# Patient Record
Sex: FEMALE | Race: WHITE | NOT HISPANIC OR LATINO
[De-identification: names, ages, dates, MRNs, and addresses within clinical notes are randomized per-mention and may not be internally consistent; named-entity substitution may affect disease eponyms.]

---

## 2017-03-17 PROBLEM — Z00.00 ENCOUNTER FOR PREVENTIVE HEALTH EXAMINATION: Status: ACTIVE | Noted: 2017-03-17

## 2017-03-22 ENCOUNTER — APPOINTMENT (OUTPATIENT)
Dept: OBGYN | Facility: CLINIC | Age: 22
End: 2017-03-22

## 2017-03-22 VITALS
DIASTOLIC BLOOD PRESSURE: 69 MMHG | HEIGHT: 60 IN | WEIGHT: 115 LBS | SYSTOLIC BLOOD PRESSURE: 104 MMHG | BODY MASS INDEX: 22.58 KG/M2

## 2017-03-22 DIAGNOSIS — Z01.419 ENCOUNTER FOR GYNECOLOGICAL EXAMINATION (GENERAL) (ROUTINE) W/OUT ABNORMAL FINDINGS: ICD-10-CM

## 2017-03-22 DIAGNOSIS — Z30.431 ENCOUNTER FOR ROUTINE CHECKING OF INTRAUTERINE CONTRACEPTIVE DEVICE: ICD-10-CM

## 2017-03-27 LAB
C TRACH RRNA SPEC QL NAA+PROBE: NORMAL
CYTOLOGY CVX/VAG DOC THIN PREP: NORMAL
N GONORRHOEA RRNA SPEC QL NAA+PROBE: NORMAL
SOURCE AMPLIFICATION: NORMAL

## 2017-12-14 ENCOUNTER — APPOINTMENT (OUTPATIENT)
Dept: OBGYN | Facility: CLINIC | Age: 22
End: 2017-12-14

## 2018-03-13 ENCOUNTER — APPOINTMENT (OUTPATIENT)
Dept: OBGYN | Facility: CLINIC | Age: 23
End: 2018-03-13
Payer: COMMERCIAL

## 2018-03-13 VITALS — SYSTOLIC BLOOD PRESSURE: 120 MMHG | DIASTOLIC BLOOD PRESSURE: 75 MMHG

## 2018-03-13 DIAGNOSIS — Z11.3 ENCOUNTER FOR SCREENING FOR INFECTIONS WITH A PREDOMINANTLY SEXUAL MODE OF TRANSMISSION: ICD-10-CM

## 2018-03-13 DIAGNOSIS — N89.8 OTHER SPECIFIED NONINFLAMMATORY DISORDERS OF VAGINA: ICD-10-CM

## 2018-03-13 PROCEDURE — 99213 OFFICE O/P EST LOW 20 MIN: CPT

## 2018-03-13 RX ORDER — BUPRENORPHINE AND NALOXONE 2; .5 MG/1; MG/1
2-0.5 TABLET SUBLINGUAL
Qty: 6 | Refills: 0 | Status: ACTIVE | COMMUNITY
Start: 2018-02-09

## 2018-03-13 RX ORDER — ESCITALOPRAM OXALATE 5 MG/1
TABLET, FILM COATED ORAL
Refills: 0 | Status: ACTIVE | COMMUNITY

## 2018-03-19 ENCOUNTER — OTHER (OUTPATIENT)
Age: 23
End: 2018-03-19

## 2018-03-19 LAB
C TRACH RRNA SPEC QL NAA+PROBE: NOT DETECTED
CANDIDA VAG CYTO: NOT DETECTED
G VAGINALIS+PREV SP MTYP VAG QL MICRO: DETECTED
N GONORRHOEA RRNA SPEC QL NAA+PROBE: NOT DETECTED
SOURCE AMPLIFICATION: NORMAL
T VAGINALIS VAG QL WET PREP: NOT DETECTED

## 2018-03-19 RX ORDER — METRONIDAZOLE 7.5 MG/G
0.75 GEL VAGINAL
Qty: 1 | Refills: 0 | Status: ACTIVE | COMMUNITY
Start: 2018-03-19 | End: 1900-01-01

## 2018-03-19 RX ORDER — METRONIDAZOLE 7.5 MG/G
0.75 GEL VAGINAL
Qty: 7 | Refills: 0 | Status: ACTIVE | COMMUNITY
Start: 2018-03-19 | End: 1900-01-01

## 2018-04-09 ENCOUNTER — APPOINTMENT (OUTPATIENT)
Dept: OBGYN | Facility: CLINIC | Age: 23
End: 2018-04-09
Payer: COMMERCIAL

## 2018-04-09 ENCOUNTER — ASOB RESULT (OUTPATIENT)
Age: 23
End: 2018-04-09

## 2018-04-09 DIAGNOSIS — Z30.430 ENCOUNTER FOR INSERTION OF INTRAUTERINE CONTRACEPTIVE DEVICE: ICD-10-CM

## 2018-04-09 DIAGNOSIS — Z30.432 ENCOUNTER FOR REMOVAL OF INTRAUTERINE CONTRACEPTIVE DEVICE: ICD-10-CM

## 2018-04-09 LAB — HCG UR QL: NEGATIVE

## 2018-04-09 PROCEDURE — 76830 TRANSVAGINAL US NON-OB: CPT

## 2018-04-09 PROCEDURE — 81025 URINE PREGNANCY TEST: CPT

## 2018-04-09 PROCEDURE — 58301 REMOVE INTRAUTERINE DEVICE: CPT

## 2018-04-09 PROCEDURE — 58300 INSERT INTRAUTERINE DEVICE: CPT

## 2018-09-04 ENCOUNTER — EMERGENCY (EMERGENCY)
Facility: HOSPITAL | Age: 23
LOS: 1 days | End: 2018-09-04
Attending: EMERGENCY MEDICINE
Payer: COMMERCIAL

## 2018-09-04 VITALS
TEMPERATURE: 99 F | RESPIRATION RATE: 18 BRPM | HEART RATE: 70 BPM | SYSTOLIC BLOOD PRESSURE: 109 MMHG | DIASTOLIC BLOOD PRESSURE: 72 MMHG | HEIGHT: 64 IN | WEIGHT: 119.93 LBS | OXYGEN SATURATION: 98 %

## 2018-09-04 DIAGNOSIS — F32.9 MAJOR DEPRESSIVE DISORDER, SINGLE EPISODE, UNSPECIFIED: ICD-10-CM

## 2018-09-04 DIAGNOSIS — F63.3 TRICHOTILLOMANIA: ICD-10-CM

## 2018-09-04 DIAGNOSIS — F13.10 SEDATIVE, HYPNOTIC OR ANXIOLYTIC ABUSE, UNCOMPLICATED: ICD-10-CM

## 2018-09-04 DIAGNOSIS — F11.10 OPIOID ABUSE, UNCOMPLICATED: ICD-10-CM

## 2018-09-04 LAB
AMPHET UR-MCNC: NEGATIVE — SIGNIFICANT CHANGE UP
ANION GAP SERPL CALC-SCNC: 15 MMOL/L — SIGNIFICANT CHANGE UP (ref 5–17)
APAP SERPL-MCNC: <7.5 UG/ML — LOW (ref 10–26)
APPEARANCE UR: CLEAR — SIGNIFICANT CHANGE UP
BACTERIA # UR AUTO: ABNORMAL
BARBITURATES UR SCN-MCNC: NEGATIVE — SIGNIFICANT CHANGE UP
BASOPHILS # BLD AUTO: 0 K/UL — SIGNIFICANT CHANGE UP (ref 0–0.2)
BASOPHILS NFR BLD AUTO: 0.1 % — SIGNIFICANT CHANGE UP (ref 0–2)
BENZODIAZ UR-MCNC: NEGATIVE — SIGNIFICANT CHANGE UP
BILIRUB UR-MCNC: NEGATIVE — SIGNIFICANT CHANGE UP
BUN SERPL-MCNC: 13 MG/DL — SIGNIFICANT CHANGE UP (ref 8–20)
CALCIUM SERPL-MCNC: 9.5 MG/DL — SIGNIFICANT CHANGE UP (ref 8.6–10.2)
CHLORIDE SERPL-SCNC: 99 MMOL/L — SIGNIFICANT CHANGE UP (ref 98–107)
CO2 SERPL-SCNC: 26 MMOL/L — SIGNIFICANT CHANGE UP (ref 22–29)
COCAINE METAB.OTHER UR-MCNC: NEGATIVE — SIGNIFICANT CHANGE UP
COLOR SPEC: YELLOW — SIGNIFICANT CHANGE UP
CREAT SERPL-MCNC: 0.67 MG/DL — SIGNIFICANT CHANGE UP (ref 0.5–1.3)
DIFF PNL FLD: NEGATIVE — SIGNIFICANT CHANGE UP
EOSINOPHIL # BLD AUTO: 0.1 K/UL — SIGNIFICANT CHANGE UP (ref 0–0.5)
EOSINOPHIL NFR BLD AUTO: 0.6 % — SIGNIFICANT CHANGE UP (ref 0–6)
EPI CELLS # UR: SIGNIFICANT CHANGE UP
ETHANOL SERPL-MCNC: <10 MG/DL — SIGNIFICANT CHANGE UP
GLUCOSE SERPL-MCNC: 155 MG/DL — HIGH (ref 70–115)
GLUCOSE UR QL: NEGATIVE MG/DL — SIGNIFICANT CHANGE UP
HCG UR QL: NEGATIVE — SIGNIFICANT CHANGE UP
HCT VFR BLD CALC: 39.6 % — SIGNIFICANT CHANGE UP (ref 37–47)
HGB BLD-MCNC: 12.8 G/DL — SIGNIFICANT CHANGE UP (ref 12–16)
KETONES UR-MCNC: NEGATIVE — SIGNIFICANT CHANGE UP
LEUKOCYTE ESTERASE UR-ACNC: ABNORMAL
LYMPHOCYTES # BLD AUTO: 1.4 K/UL — SIGNIFICANT CHANGE UP (ref 1–4.8)
LYMPHOCYTES # BLD AUTO: 14.7 % — LOW (ref 20–55)
MCHC RBC-ENTMCNC: 29.6 PG — SIGNIFICANT CHANGE UP (ref 27–31)
MCHC RBC-ENTMCNC: 32.3 G/DL — SIGNIFICANT CHANGE UP (ref 32–36)
MCV RBC AUTO: 91.5 FL — SIGNIFICANT CHANGE UP (ref 81–99)
METHADONE UR-MCNC: NEGATIVE — SIGNIFICANT CHANGE UP
MONOCYTES # BLD AUTO: 0.9 K/UL — HIGH (ref 0–0.8)
MONOCYTES NFR BLD AUTO: 9.4 % — SIGNIFICANT CHANGE UP (ref 3–10)
NEUTROPHILS # BLD AUTO: 7.2 K/UL — SIGNIFICANT CHANGE UP (ref 1.8–8)
NEUTROPHILS NFR BLD AUTO: 75.1 % — HIGH (ref 37–73)
NITRITE UR-MCNC: NEGATIVE — SIGNIFICANT CHANGE UP
OPIATES UR-MCNC: POSITIVE
PCP SPEC-MCNC: SIGNIFICANT CHANGE UP
PCP UR-MCNC: NEGATIVE — SIGNIFICANT CHANGE UP
PH UR: 9 — HIGH (ref 5–8)
PLATELET # BLD AUTO: 228 K/UL — SIGNIFICANT CHANGE UP (ref 150–400)
POTASSIUM SERPL-MCNC: 4 MMOL/L — SIGNIFICANT CHANGE UP (ref 3.5–5.3)
POTASSIUM SERPL-SCNC: 4 MMOL/L — SIGNIFICANT CHANGE UP (ref 3.5–5.3)
PROT UR-MCNC: 15 MG/DL
RBC # BLD: 4.33 M/UL — LOW (ref 4.4–5.2)
RBC # FLD: 13.8 % — SIGNIFICANT CHANGE UP (ref 11–15.6)
SALICYLATES SERPL-MCNC: <0.6 MG/DL — LOW (ref 10–20)
SODIUM SERPL-SCNC: 140 MMOL/L — SIGNIFICANT CHANGE UP (ref 135–145)
SP GR SPEC: 1.01 — SIGNIFICANT CHANGE UP (ref 1.01–1.02)
THC UR QL: POSITIVE
UROBILINOGEN FLD QL: NEGATIVE MG/DL — SIGNIFICANT CHANGE UP
WBC # BLD: 9.5 K/UL — SIGNIFICANT CHANGE UP (ref 4.8–10.8)
WBC # FLD AUTO: 9.5 K/UL — SIGNIFICANT CHANGE UP (ref 4.8–10.8)
WBC UR QL: SIGNIFICANT CHANGE UP

## 2018-09-04 PROCEDURE — 81001 URINALYSIS AUTO W/SCOPE: CPT

## 2018-09-04 PROCEDURE — 36415 COLL VENOUS BLD VENIPUNCTURE: CPT

## 2018-09-04 PROCEDURE — 81025 URINE PREGNANCY TEST: CPT

## 2018-09-04 PROCEDURE — 99285 EMERGENCY DEPT VISIT HI MDM: CPT

## 2018-09-04 PROCEDURE — 93005 ELECTROCARDIOGRAM TRACING: CPT

## 2018-09-04 PROCEDURE — 85027 COMPLETE CBC AUTOMATED: CPT

## 2018-09-04 PROCEDURE — 80307 DRUG TEST PRSMV CHEM ANLYZR: CPT

## 2018-09-04 PROCEDURE — 80048 BASIC METABOLIC PNL TOTAL CA: CPT

## 2018-09-04 PROCEDURE — 93010 ELECTROCARDIOGRAM REPORT: CPT

## 2018-09-04 RX ORDER — TRAZODONE HCL 50 MG
50 TABLET ORAL AT BEDTIME
Qty: 0 | Refills: 0 | Status: DISCONTINUED | OUTPATIENT
Start: 2018-09-04 | End: 2018-09-09

## 2018-09-04 RX ORDER — LOPERAMIDE HCL 2 MG
2 TABLET ORAL
Qty: 0 | Refills: 0 | Status: DISCONTINUED | OUTPATIENT
Start: 2018-09-04 | End: 2018-09-09

## 2018-09-04 RX ORDER — IBUPROFEN 200 MG
600 TABLET ORAL EVERY 4 HOURS
Qty: 0 | Refills: 0 | Status: DISCONTINUED | OUTPATIENT
Start: 2018-09-04 | End: 2018-09-09

## 2018-09-04 RX ADMIN — Medication 600 MILLIGRAM(S): at 23:54

## 2018-09-04 RX ADMIN — Medication 0.1 MILLIGRAM(S): at 22:31

## 2018-09-04 RX ADMIN — Medication 600 MILLIGRAM(S): at 22:31

## 2018-09-04 RX ADMIN — Medication 50 MILLIGRAM(S): at 22:31

## 2018-09-04 NOTE — ED BEHAVIORAL HEALTH ASSESSMENT NOTE - DIFFERENTIAL
Depressive disorder unspecified, heroine abuse, benzodiazepine abuse, trichotillomania r/o substance induced mood disorder r/o MDD

## 2018-09-04 NOTE — ED ADULT NURSE NOTE - HPI (INCLUDE ILLNESS QUALITY, SEVERITY, DURATION, TIMING, CONTEXT, MODIFYING FACTORS, ASSOCIATED SIGNS AND SYMPTOMS)
Patient came to ED today after going to [facility] seeking inpatient admission for depression, suicidal ideation and hx of substance abuse. She states she overdosed 2 days ago, and was taken to [facility]. Patient was tearful and expressed she was not "relieved" to be alive after her overdose. She reports she would have been "ok" if she "just didn't wake up". States she wants to get help for all of her "emotional problems" so she can "get off drugs and stop self-medicating". Patient lives with mother, who reports she has had to "kick her out" of her house several times related to her ongoing substance abuse. Patient also reports prior inpatient stay for psychiatric reasons after having a seizure from Xanax withdrawl at [facility]. Patient cooperative with intake, and denied current suicidal ideation. Patient employed, and expressed desire to get back to work once she gets "some help".
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
## 2018-09-04 NOTE — ED BEHAVIORAL HEALTH NOTE - BEHAVIORAL HEALTH NOTE
SWNote: pt seen by psych MD(Jodi) found to benefit from inpatient hospitalization. Worker called [facility](Sofia) no female bed available . Worker to search bed at other hospitals.

## 2018-09-04 NOTE — ED BEHAVIORAL HEALTH ASSESSMENT NOTE - SUMMARY
Patient is a 23 year old, female; domiciled with mother and step brother ; single; noncaregiver; employed ([employer]); past psychiatric history of depressives sx ; one prior  psychiatric  hospitalization last year at [facility] for depression and suicidal ideation; no known suicide attempts; h/o of one prior arrest for possession of stolen property; with h/o heroine/opioid  and Xanax abuse, prior withdrawal seizure from Xanax; with h/o 3 prior rehabs (last time last year) and 2 prior detox's,  with no significant past medical history; brought in by EMS from [facility]  after she presented there requesting psychiatric admission and they did not have beds available.  Patient presented two days go to [facility] after accidental overdose, reports worsening depressive s'x, with continued suicidal ideation, states she feels she is a burden to family and reports feeling upset that her life was saved, she is not able to contract for safety but want helps and wants to voluntarily sign herself into hospital. She has been using heroine on a daily basis (last time two days ago).  No psychotic or manic sx's elicited. Patient reports significant anxiety even when she is not using drugs. Collateral obtained by mom reports patient is severely depressed and she has concerns about his safety

## 2018-09-04 NOTE — ED BEHAVIORAL HEALTH NOTE - BEHAVIORAL HEALTH NOTE
SWNote: worker spoke with [facility]) possible bed in [place]. Worker faxed all needed paperwork, spoke with [facility] (Dr Foley) who will  with case review outcome. SW to follow.

## 2018-09-04 NOTE — ED BEHAVIORAL HEALTH ASSESSMENT NOTE - SUICIDE RISK FACTORS
Agitation/severe anxiety/Perceived burden on family and others/Unable to engage in safety planning/Mood episode/History of abuse/trauma/Hopelessness/Substance abuse/dependence/Access to means (pills, firearms, etc.)

## 2018-09-04 NOTE — ED BEHAVIORAL HEALTH ASSESSMENT NOTE - OTHER PAST PSYCHIATRIC HISTORY (INCLUDE DETAILS REGARDING ONSET, COURSE OF ILLNESS, INPATIENT/OUTPATIENT TREATMENT)
One prior inpatient hospitalization at [facility] last year for depressive sx's and suicidal ideation.  Denies prior suicide attempts.  H/o sporadic outpatient tx with therapist and psychiatrist. Not currently in tx. H/o trichotillomania

## 2018-09-04 NOTE — ED BEHAVIORAL HEALTH ASSESSMENT NOTE - DESCRIPTION (FIRST USE, LAST USE, QUANTITY, FREQUENCY, DURATION)
smokes 4-5 cigarettes daily First use at age 16. Patient reports that she snorts heroine (does not use IV) and also abuses oxycodone, longest period of sobriety was a year and 1/2 at age 19. Last use was 6 bags two days ago where she presented to [facility] after overdose. h/o Xanax abuse -not used for several months

## 2018-09-04 NOTE — ED ADULT TRIAGE NOTE - CHIEF COMPLAINT QUOTE
Overdosed on heroin 2 days ago and sorry she lived.  Went to [facility] today for suicidal thoughts.  No beds.  Denies alcohol/drug use today. alert and oriented X 3.  Accepted directly to behavioral health after EKG.

## 2018-09-04 NOTE — ED ADULT NURSE NOTE - OBJECTIVE STATEMENT
Patient came to ED today after going to [facility] seeking inpatient admission for depression, suicidal ideation and hx of substance abuse. She states she overdosed 2 days ago, and was taken to [facility]. Patient was tearful and expressed she was not "relieved" to be alive after her overdose. She reports she would have been "ok" if she "just didn't wake up". Patient came to ED today after going to [facility] seeking inpatient admission for depression, suicidal ideation and hx of substance abuse. She states she overdosed 2 days ago, and was taken to [facility]. Patient was tearful and expressed she was not "relieved" to be alive after her overdose. She reports she would have been "ok" if she "just didn't wake up". States she wants to get help for all of her "emotional problems" so she can "get off drugs and stop self-medicating". Patient lives with mother, who reports she has had to "kick her out" of her house several times related to her ongoing substance abuse. Patient also reports prior inpatient stay for psychiatric reasons after having a seizure from Xanax withdrawl at [facility]. Patient cooperative with intake, and denied current suicidal ideation. Patient employed, and expressed desire to get back to work once she gets "some help".

## 2018-09-04 NOTE — ED ADULT NURSE NOTE - CHIEF COMPLAINT QUOTE
Overdosed on heroin 2 days ago and sorry she lived.  Went to [facility] today for suicidal thoughts.  No beds.  Denies alcohol/drug use today. alert and oriented X 3.  Accepted directly to behavioral health after EKG.

## 2018-09-04 NOTE — ED BEHAVIORAL HEALTH ASSESSMENT NOTE - HPI (INCLUDE ILLNESS QUALITY, SEVERITY, DURATION, TIMING, CONTEXT, MODIFYING FACTORS, ASSOCIATED SIGNS AND SYMPTOMS)
Patient is a 23 year old, female; domiciled with mother and step brother ; single; noncaregiver; employed ([email or web address]); past psychiatric history of depressives sx ; one prior  psychiatric  hospitalization last year at [facility] for depression and suicidal ideation; no known suicide attempts; h/o of one prior arrest for possession of stolen property; with h/o heroine/opioid  and Xanax abuse, prior withdrawal seizure from Xanax; with h/o 3 prior rehabs (last time last year) and 2 prior detox's,  with no significant past medical history; brought in by EMS from [facility]  after she presented there requesting psychiatric admission and they did not have beds available.       Patient reports that she uses a few bundles of heroine on a daily basis.  She started using at age 16 and she has not been able to stop using for more that as few days ago. She states she was last clean for 5 days three weeks ago. She presented to [facility] after an accidental overdose to days ago. She states that she snorted two bags of heroine and that is the last thing she remembered. She states that this was her last use fo drugs. She was revived with Narcan and was upset to be alive.  She reported that she kept thinking why was I saved? Why am I alive? Patient reports that she has no purpose and is a burden to others. She notes that she has been depressed since age 11 but has feel more severely depressed over the last several weeks. She reports feelings of hopelessness, excessive sleep, poor appetite, helplessness, poor concentration and increasing suicidal ideation over the last few weeks. Patient reports today she contemplated hanging herself. Her intention is currently ambivalent, but she is unable to contract for safety.  She feels that she needs to help.   She feels that she is never at peace and feels that no one understands her.  She reports feeling much worse now that prior to her last psychiatric hospitalization        Spoke with mother who confirmed that patient overdosed two days ago.  After she was revived patient made statements like "I wish you would have left me",  and "I would be at peace".   She feels patient has been extremely depressed-more so over the last several weeks and is concerned about her safety.  She reports that patient has long h/o anxiety that predated her drug use (since age 11) and pulls out her hair on her head and eye brows secondary to her anxiety.  She has been in therapy in the past but "never stuck with it".  She states patient was doing better a year and 1/2 between age 19 and 21 when she was taken Buspar and an antidepressant but that even when she was clean she has struggled with depression and anxiety.  She stated patient disclosed suicidal thoughts to her and also that "it hurts to feel like this" and that she "wanted help"    Concerning other psychiatric symptoms, pt denies  any aggressive or violent behavior towards others. Pt denies any episodes of bizarre happiness, unusual energy, unusual nightime excitation or other common symptoms of shaun. Pt denies hearing voices or seeing things.  No delusions were elicited.  Patient does reports she is anxious all the time, even when she does not take drugs.  She feels that the drugs help her avoid negative feelings.  She has been working for several month and states she is constantly high because it helps her avoid painful feelings.

## 2018-09-04 NOTE — ED BEHAVIORAL HEALTH ASSESSMENT NOTE - RISK ASSESSMENT
High-Given depressive sx's, heroine use, suicidal ideation, high psychic anxiety, inability to contract for safety, recent overdose, lack of outpatient tx, hopelessness.

## 2018-09-05 VITALS
TEMPERATURE: 98 F | DIASTOLIC BLOOD PRESSURE: 60 MMHG | RESPIRATION RATE: 20 BRPM | HEART RATE: 74 BPM | SYSTOLIC BLOOD PRESSURE: 100 MMHG | OXYGEN SATURATION: 100 %

## 2018-09-05 PROCEDURE — 99213 OFFICE O/P EST LOW 20 MIN: CPT

## 2018-09-05 NOTE — ED ADULT NURSE REASSESSMENT NOTE - GENERAL PATIENT STATE
smiling/interactive/comfortable appearance/cooperative/resting/sleeping
no change observed/cooperative/resting/sleeping

## 2018-09-05 NOTE — ED BEHAVIORAL HEALTH NOTE - BEHAVIORAL HEALTH NOTE
PROGRESS NOTE: 18 @ 08:24  	  • Reason for Ongoing Consultation: 	Depression/suicidality/heroine abuse    ID: 23yyo Female with HEALTH ISSUES - PROBLEM Dx:  Trichotillomania  Benzodiazepine abuse  Heroin abuse  Depressive disorder          INTERVAL DATA:   • Interval Chief Complaint: I am feeling blah  • Interval History:  Patient continues to be depressed.  She reported some muscle cramping last night, difficulty sleeping.  She was given PRN's of clonidine, ibuprofen and Trazodone. She reports she has difficulty sleeping but taking Trazodone helped.   Her vital signs were reviewed. They are stable.  There is no current acute signs of opoid withdrawal.  Patient's urine was positive for cannabis use.  Patient reports she uses sporadically and states she does not remember last use.  She continues to be a high suicide risk and requesting voluntary hospitalization.   No psychotic sx's were elicited.     REVIEW OF SYSTEMS:   • Constitutional Symptoms	No complaints  • Eyes	No complaints  • Ears / Nose / Throat / Mouth	No complaints  • Cardiovascular	No complaints  • Respiratory	No complaints  • Gastrointestinal	No complaints  • Genitourinary	No complaints  • Musculoskeletal	No complaints  • Skin	No complaints  • Neurological	No complaints  • Psychiatric (see HPI)	See HPI  • Endocrine	No complaints  • Hematologic / Lymphatic	No complaints  • Allergic / Immunologic	No complaints    REVIEW OF VITALS/LABS/IMAGING/INVESTIGATIONS:   • Vital signs reviewed: Yes  • Vital Signs:	    T(C): 36.7 (18 @ 08:02), Max: 37.1 (18 @ 19:18)  HR: 63 (18 @ 08:02) (63 - 81)  BP: 102/61 (18 @ 08:02) (94/57 - 109/72)  RR: 18 (18 @ 08:02) (17 - 18)  SpO2: 99% (18 @ 08:02) (98% - 100%)    • Available labs reviewed: Yes  • Available Lab Results:                           12.8   9.5   )-----------( 228      ( 04 Sep 2018 19:18 )             39.6         140  |  99  |  13.0  ----------------------------<  155<H>  4.0   |  26.0  |  0.67    Ca    9.5      04 Sep 2018 19:17          Urinalysis Basic - ( 04 Sep 2018 17:13 )    Color: Yellow / Appearance: Clear / S.015 / pH: x  Gluc: x / Ketone: Negative  / Bili: Negative / Urobili: Negative mg/dL   Blood: x / Protein: 15 mg/dL / Nitrite: Negative   Leuk Esterase: Trace / RBC: x / WBC 3-5   Sq Epi: x / Non Sq Epi: Occasional / Bacteria: Occasional          MEDICATIONS:      PRN Medications: Yes   • PRN Medications since last evaluation	  • PRN Details: See interval data	    Current Medications:   cloNIDine  Oral Tab/Cap - Peds 0.1 milliGRAM(s) Oral three times a day PRN  ibuprofen  Tablet. 600 milliGRAM(s) Oral every 4 hours PRN  loperamide 2 milliGRAM(s) Oral every 2 hours PRN  traZODone 50 milliGRAM(s) Oral at bedtime PRN     Medication Side Effects:  • Medication Side Effects or Adverse Reactions (new or ongoing)	None known    MENTAL STATUS EXAM:   • Level of Consciousness	Alert  • General Appearance	Well developed  • Body Habitus	Well nourished  • Hygiene	Fair  • Grooming	Poor  • Behavior	Cooperative  • Eye Contact	Poor   • Relatedness	Fair   • Impulse Control	Normal  • Muscle Tone / Strength	Normal muscle tone/strength  • Abnormal Movements	No abnormal movements  • Gait / Station	Not assessed   • Speech Volume	Low  • Speech Rate	Normal  • Speech Spontaneity	non spontaneous   • Speech Articulation	Normal  • Mood	Normal  • Affect Quality	Depressed  • Affect Range	Constricted   • Affect Congruence	Congruent  • Thought Process	Linear  • Thought Associations	Normal  • Thought Content	Suicidal ideation   • Perceptions	No abnormalities  • Oriented to Time	Yes  • Oriented to Place	Yes  • Oriented to Situation	Yes  • Oriented to Person	Yes  • Attention / Concentration	Normal  • Estimated Intelligence	Average  • Recent Memory	Normal  • Remote Memory	Normal  • Fund of Knowledge	Normal  • Language	No abnormalities noted  • Judgment (regarding everyday events)	Poor   • Insight (regarding psychiatric illness)	Fair    SUICIDALITY:   • Suicidality (Interval): Guarded, continues to be suicide risk     HOMICIDALITY/AGGRESSION:   • Homicidality/Aggression	none known    DIAGNOSIS DSM-V:    Psychiatric Diagnosis (Corresponds to DSM-IV Axis I, II):   HEALTH ISSUES - PROBLEM Dx:  Trichotillomania  Benzodiazepine abuse  Heroin abuse  Depressive disorder           Medical Diagnosis (Corresponds to DSM-IV Axis III):  • Axis III	Denies       ASSESSMENT OF CURRENT CONDITION:   · Summary	Patient is a 23 year old, female; domiciled with mother and step brother ; single; noncaregiver; employed ([employer]); past psychiatric history of depressives sx ; one prior  psychiatric  hospitalization last year at [facility] for depression and suicidal ideation; no known suicide attempts; h/o of one prior arrest for possession of stolen property; with h/o heroine/opioid  and Xanax abuse, prior withdrawal seizure from Xanax; with h/o 3 prior rehabs (last time last year) and 2 prior detox's,  with no significant past medical history; brought in by EMS from [facility]  after she presented there requesting psychiatric admission and they did not have beds available.  Patient presented two days go to [facility] after accidental overdose, reports worsening depressive s'x, with continued suicidal ideation, states she feels she is a burden to family and reports feeling upset that her life was saved, she is not able to contract for safety but want helps and wants to voluntarily sign herself into hospital. She has been using heroine on a daily basis (last time two days ago).  No psychotic or manic sx's elicited. Patient reports significant anxiety even when she is not using drugs. Collateral obtained by mom reports patient is severely depressed and she has concerns about his safety    · Differential	Depressive disorder unspecified, heroine abuse, benzodiazepine abuse, trichotillomania r/o substance induced mood disorder r/o MDD    · Risk Assessment	High-Given depressive sx's, heroine use, suicidal ideation, high psychic anxiety, inability to contract for safety, recent overdose, lack of outpatient tx, hopelessness.    Plan   Transer to inpatient psychiatry.  Looking for available beds.

## 2018-09-05 NOTE — ED BEHAVIORAL HEALTH NOTE - BEHAVIORAL HEALTH NOTE
SW Note: Pt has been accepted to [facility] for inpt psychiatric care. Accepting MD, Dr. Sparks. 9.13 legals completed. pt in agreement to he plan. gave verbal permission to notify her mother, Bisi [phone], msg left. Ambulance arranged with [facility]. Insurance precert completed. Spoke with Michael form [phone]. Auth approved for 6 days 9/5/18-9/10/18. Auth# [id number]. Review due 9/10 with Andie AGEE [phone]. Info forwarded to [facility] UR dept.

## 2018-09-05 NOTE — ED ADULT NURSE REASSESSMENT NOTE - NS ED NURSE REASSESS COMMENT FT1
Assumed care of pt @1900. Pt denies any current withdrawal symptoms or current suicidal thoughts. Pt pleasant/interactive with staff. Pt aware of plan of care. Pt currently talking to her mother on the community phone, resting comfortably on stretcher, in no apparent distress. VSS. No other pt complaints at this time. Will continue to monitor the pt for safety.
Pt has been resting/sleeping throughout the night, in no apparent discomfort or distress. Respirations even & unlabored. No pt complaints. No harm to self. Safety maintained. Will continue to monitor the pt.

## 2019-04-12 NOTE — ED ADULT TRIAGE NOTE - BSA (M2)
Vaccine Information Sheet (VIS) provided - VIS date: 8/7/15/Risks/benefits discussed with the parent(s)/legal guardian/emancipated minor
1.57

## 2019-05-02 NOTE — ED BEHAVIORAL HEALTH ASSESSMENT NOTE - THOUGHT CONTENT
[facility] left this patient a voicemail on 4.11 and 4.2 5.2 in order to schedule an appointment. The patient has not returned our phone call. If you would like us to call the patient again, please let us know. Otherwise, we will defer the order to it's expiration date and await the patient's return call. Please contact us with any questions or concerns at [phone].    
You do not have to call again.  
Suicidality

## 2019-10-15 NOTE — ED ADULT NURSE NOTE - NS ED NURSE LEVEL OF CONSCIOUSNESS ORIENTATION
Destinee Caputo is a 63 year old female presenting with   Chief Complaint   Patient presents with   • Skin Assessment     KARY        Medications verified, no changes.  Denies known Latex allergy or symptoms of Latex sensitivity.       Oriented - self; Oriented - place; Oriented - time

## 2019-12-23 ENCOUNTER — INPATIENT (INPATIENT)
Facility: HOSPITAL | Age: 24
LOS: 4 days | Discharge: HOME | DRG: 493 | End: 2019-12-28
Attending: ORTHOPAEDIC SURGERY | Admitting: HOSPITALIST
Payer: COMMERCIAL

## 2019-12-23 VITALS
TEMPERATURE: 98 F | RESPIRATION RATE: 18 BRPM | HEART RATE: 95 BPM | OXYGEN SATURATION: 100 % | SYSTOLIC BLOOD PRESSURE: 137 MMHG | DIASTOLIC BLOOD PRESSURE: 83 MMHG | WEIGHT: 119.93 LBS

## 2019-12-23 DIAGNOSIS — S82.92XA UNSPECIFIED FRACTURE OF LEFT LOWER LEG, INITIAL ENCOUNTER FOR CLOSED FRACTURE: ICD-10-CM

## 2019-12-23 LAB
ALBUMIN SERPL ELPH-MCNC: 4 G/DL — SIGNIFICANT CHANGE UP (ref 3.3–5)
ALP SERPL-CCNC: 70 U/L — SIGNIFICANT CHANGE UP (ref 40–120)
ALT FLD-CCNC: 35 U/L — SIGNIFICANT CHANGE UP (ref 12–78)
ANION GAP SERPL CALC-SCNC: 5 MMOL/L — SIGNIFICANT CHANGE UP (ref 5–17)
APAP SERPL-MCNC: < 2 UG/ML (ref 10–30)
APPEARANCE UR: CLEAR — SIGNIFICANT CHANGE UP
APTT BLD: 29.5 SEC — SIGNIFICANT CHANGE UP (ref 27.5–36.3)
AST SERPL-CCNC: 89 U/L — HIGH (ref 15–37)
BASOPHILS # BLD AUTO: 0.02 K/UL — SIGNIFICANT CHANGE UP (ref 0–0.2)
BASOPHILS NFR BLD AUTO: 0.3 % — SIGNIFICANT CHANGE UP (ref 0–2)
BILIRUB SERPL-MCNC: 0.2 MG/DL — SIGNIFICANT CHANGE UP (ref 0.2–1.2)
BILIRUB UR-MCNC: NEGATIVE — SIGNIFICANT CHANGE UP
BUN SERPL-MCNC: 11 MG/DL — SIGNIFICANT CHANGE UP (ref 7–23)
CALCIUM SERPL-MCNC: 8.8 MG/DL — SIGNIFICANT CHANGE UP (ref 8.5–10.1)
CHLORIDE SERPL-SCNC: 104 MMOL/L — SIGNIFICANT CHANGE UP (ref 96–108)
CO2 SERPL-SCNC: 28 MMOL/L — SIGNIFICANT CHANGE UP (ref 22–31)
COLOR SPEC: YELLOW — SIGNIFICANT CHANGE UP
CREAT SERPL-MCNC: 0.89 MG/DL — SIGNIFICANT CHANGE UP (ref 0.5–1.3)
DIFF PNL FLD: ABNORMAL
EOSINOPHIL # BLD AUTO: 0.13 K/UL — SIGNIFICANT CHANGE UP (ref 0–0.5)
EOSINOPHIL NFR BLD AUTO: 2 % — SIGNIFICANT CHANGE UP (ref 0–6)
ETHANOL SERPL-MCNC: <10 MG/DL — SIGNIFICANT CHANGE UP (ref 0–10)
GLUCOSE SERPL-MCNC: 105 MG/DL — HIGH (ref 70–99)
GLUCOSE UR QL: NEGATIVE MG/DL — SIGNIFICANT CHANGE UP
HCG SERPL-ACNC: <1 MIU/ML — SIGNIFICANT CHANGE UP
HCT VFR BLD CALC: 37.5 % — SIGNIFICANT CHANGE UP (ref 34.5–45)
HGB BLD-MCNC: 12.1 G/DL — SIGNIFICANT CHANGE UP (ref 11.5–15.5)
IMM GRANULOCYTES NFR BLD AUTO: 0.9 % — SIGNIFICANT CHANGE UP (ref 0–1.5)
INR BLD: 0.95 RATIO — SIGNIFICANT CHANGE UP (ref 0.88–1.16)
KETONES UR-MCNC: ABNORMAL
LEUKOCYTE ESTERASE UR-ACNC: NEGATIVE — SIGNIFICANT CHANGE UP
LYMPHOCYTES # BLD AUTO: 2.12 K/UL — SIGNIFICANT CHANGE UP (ref 1–3.3)
LYMPHOCYTES # BLD AUTO: 32.4 % — SIGNIFICANT CHANGE UP (ref 13–44)
MCHC RBC-ENTMCNC: 30.3 PG — SIGNIFICANT CHANGE UP (ref 27–34)
MCHC RBC-ENTMCNC: 32.3 GM/DL — SIGNIFICANT CHANGE UP (ref 32–36)
MCV RBC AUTO: 93.8 FL — SIGNIFICANT CHANGE UP (ref 80–100)
MONOCYTES # BLD AUTO: 0.53 K/UL — SIGNIFICANT CHANGE UP (ref 0–0.9)
MONOCYTES NFR BLD AUTO: 8.1 % — SIGNIFICANT CHANGE UP (ref 2–14)
NEUTROPHILS # BLD AUTO: 3.68 K/UL — SIGNIFICANT CHANGE UP (ref 1.8–7.4)
NEUTROPHILS NFR BLD AUTO: 56.3 % — SIGNIFICANT CHANGE UP (ref 43–77)
NITRITE UR-MCNC: NEGATIVE — SIGNIFICANT CHANGE UP
PCP SPEC-MCNC: SIGNIFICANT CHANGE UP
PH UR: 5 — SIGNIFICANT CHANGE UP (ref 5–8)
PLATELET # BLD AUTO: 222 K/UL — SIGNIFICANT CHANGE UP (ref 150–400)
POTASSIUM SERPL-MCNC: 3.9 MMOL/L — SIGNIFICANT CHANGE UP (ref 3.5–5.3)
POTASSIUM SERPL-SCNC: 3.9 MMOL/L — SIGNIFICANT CHANGE UP (ref 3.5–5.3)
PROT SERPL-MCNC: 8.3 GM/DL — SIGNIFICANT CHANGE UP (ref 6–8.3)
PROT UR-MCNC: NEGATIVE MG/DL — SIGNIFICANT CHANGE UP
PROTHROM AB SERPL-ACNC: 10.5 SEC — SIGNIFICANT CHANGE UP (ref 10–12.9)
RBC # BLD: 4 M/UL — SIGNIFICANT CHANGE UP (ref 3.8–5.2)
RBC # FLD: 14.9 % — HIGH (ref 10.3–14.5)
SODIUM SERPL-SCNC: 137 MMOL/L — SIGNIFICANT CHANGE UP (ref 135–145)
SP GR SPEC: 1.03 — HIGH (ref 1.01–1.02)
TROPONIN I SERPL-MCNC: <0.015 NG/ML — SIGNIFICANT CHANGE UP (ref 0.01–0.04)
UROBILINOGEN FLD QL: NEGATIVE MG/DL — SIGNIFICANT CHANGE UP
WBC # BLD: 6.54 K/UL — SIGNIFICANT CHANGE UP (ref 3.8–10.5)
WBC # FLD AUTO: 6.54 K/UL — SIGNIFICANT CHANGE UP (ref 3.8–10.5)

## 2019-12-23 PROCEDURE — 81025 URINE PREGNANCY TEST: CPT

## 2019-12-23 PROCEDURE — 82550 ASSAY OF CK (CPK): CPT

## 2019-12-23 PROCEDURE — 80053 COMPREHEN METABOLIC PANEL: CPT

## 2019-12-23 PROCEDURE — 73700 CT LOWER EXTREMITY W/O DYE: CPT | Mod: 26,LT

## 2019-12-23 PROCEDURE — 86900 BLOOD TYPING SEROLOGIC ABO: CPT

## 2019-12-23 PROCEDURE — 80048 BASIC METABOLIC PNL TOTAL CA: CPT

## 2019-12-23 PROCEDURE — 73590 X-RAY EXAM OF LOWER LEG: CPT | Mod: LT

## 2019-12-23 PROCEDURE — C1769: CPT

## 2019-12-23 PROCEDURE — 93010 ELECTROCARDIOGRAM REPORT: CPT

## 2019-12-23 PROCEDURE — 85730 THROMBOPLASTIN TIME PARTIAL: CPT

## 2019-12-23 PROCEDURE — 76376 3D RENDER W/INTRP POSTPROCES: CPT | Mod: 26

## 2019-12-23 PROCEDURE — 73564 X-RAY EXAM KNEE 4 OR MORE: CPT | Mod: LT

## 2019-12-23 PROCEDURE — 85610 PROTHROMBIN TIME: CPT

## 2019-12-23 PROCEDURE — 84100 ASSAY OF PHOSPHORUS: CPT

## 2019-12-23 PROCEDURE — 83735 ASSAY OF MAGNESIUM: CPT

## 2019-12-23 PROCEDURE — 85027 COMPLETE CBC AUTOMATED: CPT

## 2019-12-23 PROCEDURE — 99221 1ST HOSP IP/OBS SF/LOW 40: CPT | Mod: 57,GC

## 2019-12-23 PROCEDURE — 71045 X-RAY EXAM CHEST 1 VIEW: CPT

## 2019-12-23 PROCEDURE — 76000 FLUOROSCOPY <1 HR PHYS/QHP: CPT

## 2019-12-23 PROCEDURE — 95819 EEG AWAKE AND ASLEEP: CPT

## 2019-12-23 PROCEDURE — 70450 CT HEAD/BRAIN W/O DYE: CPT | Mod: 26

## 2019-12-23 PROCEDURE — 73564 X-RAY EXAM KNEE 4 OR MORE: CPT | Mod: 26,LT

## 2019-12-23 PROCEDURE — 73610 X-RAY EXAM OF ANKLE: CPT | Mod: LT

## 2019-12-23 PROCEDURE — 86850 RBC ANTIBODY SCREEN: CPT

## 2019-12-23 PROCEDURE — 86901 BLOOD TYPING SEROLOGIC RH(D): CPT

## 2019-12-23 PROCEDURE — 73610 X-RAY EXAM OF ANKLE: CPT | Mod: 26,LT

## 2019-12-23 PROCEDURE — 97162 PT EVAL MOD COMPLEX 30 MIN: CPT | Mod: GP

## 2019-12-23 PROCEDURE — 93005 ELECTROCARDIOGRAM TRACING: CPT

## 2019-12-23 PROCEDURE — C1713: CPT

## 2019-12-23 PROCEDURE — 73590 X-RAY EXAM OF LOWER LEG: CPT | Mod: 26,LT,76

## 2019-12-23 PROCEDURE — 71045 X-RAY EXAM CHEST 1 VIEW: CPT | Mod: 26

## 2019-12-23 PROCEDURE — 36415 COLL VENOUS BLD VENIPUNCTURE: CPT

## 2019-12-23 RX ORDER — LEVETIRACETAM 250 MG/1
1000 TABLET, FILM COATED ORAL ONCE
Refills: 0 | Status: COMPLETED | OUTPATIENT
Start: 2019-12-23 | End: 2019-12-23

## 2019-12-23 RX ORDER — ACETAMINOPHEN 500 MG
650 TABLET ORAL ONCE
Refills: 0 | Status: COMPLETED | OUTPATIENT
Start: 2019-12-23 | End: 2019-12-23

## 2019-12-23 RX ADMIN — Medication 1 MILLIGRAM(S): at 21:10

## 2019-12-23 RX ADMIN — LEVETIRACETAM 1000 MILLIGRAM(S): 250 TABLET, FILM COATED ORAL at 21:50

## 2019-12-23 RX ADMIN — LEVETIRACETAM 400 MILLIGRAM(S): 250 TABLET, FILM COATED ORAL at 21:34

## 2019-12-23 NOTE — ED PROVIDER NOTE - PROGRESS NOTE DETAILS
25 yo female with a PMH of xanax abuse, heroin use, marijuana use, smoker presents with withdrawal seizure from xanax. Pt has been using xanax consecutively for the last few days and did not take it today and had a witnessed seizure from a friend, lasting for approx 10 min. Pt snorted heroin also PTA and used marijuana. SPoke with ortho about the spiral fracture. They will come down to see her. -Oscar Jolley PA-C Spoke with Dr. Duran (neuro). States pt should be admitted, observed, no anticonvulsant medication needed and, should have psych consult. -Oscar Jolley PA-C Rudi Fay for attending Dr. Bergman: Pt with hx of multi drug abuse here with seizures ,probable xanax withdrawal but questionable hx being on keppra previous. Probably broken leg, labs, ct head, ativan, keppra, ortho

## 2019-12-23 NOTE — CONSULT NOTE ADULT - SUBJECTIVE AND OBJECTIVE BOX
Patient is a 23 YO F who presented to ED s/p seizure and was found to have left tib/fib fracture. Patient had multiple seizures in ED and was postictal/altered, unable to answer questions. Patient's sister and friend at bedside assisted with hx/hpi. Patient has a history of drug abuse including heroin and Xanax. Patient has had seizures in the past from Xanax withdraws. Per the patient's friend, they were walking in a store and the patient collapsed into the floor, seizing. Patient's friend did not see the fall. Unknown HS. Patient is community ambulator. No other health issues per sister/friend.     PMHx: substance abuse  PSHx: denies  Meds: see rec  All: NKDA    Vital Signs Last 24 Hrs  T(C): 36.7 (23 Dec 2019 18:45), Max: 36.7 (23 Dec 2019 18:45)  T(F): 98.1 (23 Dec 2019 18:45), Max: 98.1 (23 Dec 2019 18:45)  HR: 89 (23 Dec 2019 22:31) (89 - 107)  BP: 108/62 (23 Dec 2019 22:31) (108/62 - 137/83)  BP(mean): 76 (23 Dec 2019 22:31) (76 - 96)  RR: 18 (23 Dec 2019 22:31) (18 - 18)  SpO2: 99% (23 Dec 2019 22:31) (98% - 100%)                          12.1   6.54  )-----------( 222      ( 23 Dec 2019 19:05 )             37.5   12-23    137  |  104  |  11  ----------------------------<  105<H>  3.9   |  28  |  0.89    Ca    8.8      23 Dec 2019 19:05    TPro  8.3  /  Alb  4.0  /  TBili  0.2  /  DBili  x   /  AST  89<H>  /  ALT  35  /  AlkPhos  70  12-23    PE:  Gen: Postictal, lethargic, not currently verbal, not able to participate with exam  RLE: Skin intact, +ecchymosis/swelling over anterior tibia, no skin tenting, no crepitus with hip/knee/ankle motion, compartments soft, 2+ DP pulse  2/2: no obvious deformity, all bony structures palpated and all joints ranged, no other injuries detected     Procedure: patient was placed in wall padded trilam splint/knee immobilizer, post splinting xray obtained. Patient tolerated procedure will. Patient is a 25 YO F who presented to ED s/p seizure and was found to have left tib/fib fracture. Patient had multiple seizures in ED and was postictal/altered, unable to answer questions. Patient's sister and friend at bedside assisted with hx/hpi. Patient has a history of drug abuse including heroin and Xanax. Patient has had seizures in the past from Xanax withdraws. Per the patient's friend, they were walking in a store and the patient collapsed into the floor, seizing. Patient's friend did not see the fall. Unknown HS. Patient is community ambulator. No other health issues per sister/friend.     PMHx: substance abuse  PSHx: denies  Meds: see rec  All: NKDA    Vital Signs Last 24 Hrs  T(C): 36.7 (23 Dec 2019 18:45), Max: 36.7 (23 Dec 2019 18:45)  T(F): 98.1 (23 Dec 2019 18:45), Max: 98.1 (23 Dec 2019 18:45)  HR: 89 (23 Dec 2019 22:31) (89 - 107)  BP: 108/62 (23 Dec 2019 22:31) (108/62 - 137/83)  BP(mean): 76 (23 Dec 2019 22:31) (76 - 96)  RR: 18 (23 Dec 2019 22:31) (18 - 18)  SpO2: 99% (23 Dec 2019 22:31) (98% - 100%)                          12.1   6.54  )-----------( 222      ( 23 Dec 2019 19:05 )             37.5   12-23    137  |  104  |  11  ----------------------------<  105<H>  3.9   |  28  |  0.89    Ca    8.8      23 Dec 2019 19:05    TPro  8.3  /  Alb  4.0  /  TBili  0.2  /  DBili  x   /  AST  89<H>  /  ALT  35  /  AlkPhos  70  12-23    XR demonstrated L midshaft tib/fib Fx    PE:  Gen: Postictal, lethargic, not currently verbal, not able to participate with exam  RLE: Skin intact, +ecchymosis/swelling over anterior tibia, no skin tenting, no crepitus with hip/knee/ankle motion, compartments soft, 2+ DP pulse  2/2: no obvious deformity, all bony structures palpated and all joints ranged, no other injuries detected     Procedure: patient was placed in wall padded trilam splint/knee immobilizer, post splinting xray obtained. Patient tolerated procedure will.

## 2019-12-23 NOTE — CONSULT NOTE ADULT - ASSESSMENT
23 YO F with L tib/fib Fx  Pain control  NWB LLE in trilam splint/KI  Elevate LLE  Admit to medicine  Will need medical management of seizures/withdraw  FU medical clearance for OR  Plan for ORIF of L tib/fib Fx  Will discuss with Dr. West  NPO  IVF  Hold AC  Plan for OR when patient medically stable

## 2019-12-23 NOTE — ED PROVIDER NOTE - CLINICAL SUMMARY MEDICAL DECISION MAKING FREE TEXT BOX
23 yo female presents with seizure from xanax withdrawal. Pt had 2 seizures while in the ER and given 1 mg ativan. Will consult ortho, neuro, labs, UA, u tox, ekg, CT head, XR lower extremity and likely admission. -Oscar Jolley PA-C

## 2019-12-23 NOTE — ED PROVIDER NOTE - CONSTITUTIONAL, MLM
normal... Well appearing, awake, alert, oriented to person, place, time/situation and in no apparent distress. +Drowsy. Awake, alert, oriented to person, place, time/situation +Called to see pt, pt lying on stretcher eyes closed, after 5 minutes pt became more alert and opened eyes to voice

## 2019-12-23 NOTE — ED PROVIDER NOTE - NEUROLOGICAL, MLM
Alert and oriented, no focal deficits, no motor or sensory deficits. Alert and oriented, no focal deficits, no motor or sensory deficits. +Answered questions fully +Called to see pt, pt lying on stretcher eyes closed, after 5 minutes pt became more alert and opened eyes to voice

## 2019-12-23 NOTE — ED ADULT TRIAGE NOTE - CHIEF COMPLAINT QUOTE
pt presents to ED due to possible seizure pt admits she was using xanax for the last 3 days not prescribed pt fell coming out of store front deformity to left lower leg

## 2019-12-23 NOTE — ED PROVIDER NOTE - MUSCULOSKELETAL, MLM
Spine appears normal, range of motion is not limited, no muscle or joint tenderness +LLE external rotation, ecchymosis

## 2019-12-23 NOTE — ED ADULT NURSE NOTE - OBJECTIVE STATEMENT
pt presents to Ed with complaints of seizure and left leg deformity. pt reports taking recreational xanax daily, but did not use today. pt states she has a hx of withdrawal seizures. pt also endorses daily use of heroin and marijuana. pt states she seized today and when she fell she hurt her left leg. + deformity to left lower leg. 18 g placed to right AC. labs sent. EKG performed. Tele monitoring initiated.

## 2019-12-23 NOTE — ED PROVIDER NOTE - OBJECTIVE STATEMENT
23 y/o female with PMHx of drug abuse and frequent seizures presents to ED s/p seizure today. Per triage fell coming out of a store. Fall witnessed by pt's friend and sister. Episode lasted about 10 minutes and postictal phase lasted about 10 minutes. Unknown head injury, pt was on the floor. +LLE deformity. Pt admits she has been using Xanax for the past 3 days that was not prescribed and snorted heroin and smoked marijuana and cigarettes today. Per pt's sister at bedside pt has had 6 seizures in the past year due to Xanax withdrawal. Smoker. NKDA. 23 y/o female with PMHx of drug abuse and frequent seizures presents to ED s/p seizure today. Per triage fell coming out of a store. Fall witnessed by pt's friend and sister. Episode lasted about 10 minutes and postictal phase lasted about 10 minutes. Unknown head injury, pt was on the floor. +LLE deformity. Pt admits she has been using Xanax for the past 3 days that was not prescribed and snorted heroin and smoked marijuana and cigarettes today. Per pt's sister at bedside pt has had 6 seizures in the past year due to Xanax withdrawal. Unknown if pt has been on keppra. Smoker. NKDA.

## 2019-12-23 NOTE — ED ADULT NURSE REASSESSMENT NOTE - NS ED NURSE REASSESS COMMENT FT1
2115 patient having a tonic/clonic seizure.  MD Bergman to room to evaluate patient stat dose of Ativan 1mg IVP given. Seizure stopped. Patient opened eyes to noxious stimuli.  Straight Catheterization performed U/A, Tox Screen sent as per MD orders.  Necklaces and silver colored metal and silver colored metal bracelet removed and given to friend Pedro Self.  Ice packs applied to left leg.  Left leg x-ray taken at the bedside.  MD Bergman to review x-ray.  Patient positioned to protect airway with head of bed at 45 degree angle.  Oxyen saturation 100% on 2 Liters Nasal cannula.  /70.  Left IV is flushed and patent with no signs of infiltration  noted.  Friends at bedside.  Warm blanket given.  patient post ictal.

## 2019-12-23 NOTE — CONSULT NOTE ADULT - ATTENDING COMMENTS
Pt seen and examined.  H&P reviewed as well as relevant imaging. History of substance abuse acknowledged.  Seizure activity.  Optimization appreciated.   Left tib/fib fracture. RBA regarding surgery discussed. Plan to proceed with surgical treatment. Postop recovery discussed.

## 2019-12-23 NOTE — ED ADULT NURSE NOTE - NSIMPLEMENTINTERV_GEN_ALL_ED
Implemented All Fall Risk Interventions:  [place] to call system. Call bell, personal items and telephone within reach. Instruct patient to call for assistance. Room bathroom lighting operational. Non-slip footwear when patient is off stretcher. Physically safe environment: no spills, clutter or unnecessary equipment. Stretcher in lowest position, wheels locked, appropriate side rails in place. Provide visual cue, wrist band, yellow gown, etc. Monitor gait and stability. Monitor for mental status changes and reorient to person, place, and time. Review medications for side effects contributing to fall risk. Reinforce activity limits and safety measures with patient and family.

## 2019-12-24 LAB
ALBUMIN SERPL ELPH-MCNC: 3.1 G/DL — LOW (ref 3.3–5)
ALP SERPL-CCNC: 60 U/L — SIGNIFICANT CHANGE UP (ref 40–120)
ALT FLD-CCNC: 31 U/L — SIGNIFICANT CHANGE UP (ref 12–78)
ANION GAP SERPL CALC-SCNC: 6 MMOL/L — SIGNIFICANT CHANGE UP (ref 5–17)
APTT BLD: 30.6 SEC — SIGNIFICANT CHANGE UP (ref 27.5–36.3)
APTT BLD: 31.7 SEC — SIGNIFICANT CHANGE UP (ref 27.5–36.3)
AST SERPL-CCNC: 67 U/L — HIGH (ref 15–37)
BILIRUB SERPL-MCNC: 0.3 MG/DL — SIGNIFICANT CHANGE UP (ref 0.2–1.2)
BUN SERPL-MCNC: 9 MG/DL — SIGNIFICANT CHANGE UP (ref 7–23)
CALCIUM SERPL-MCNC: 7.9 MG/DL — LOW (ref 8.5–10.1)
CHLORIDE SERPL-SCNC: 107 MMOL/L — SIGNIFICANT CHANGE UP (ref 96–108)
CK SERPL-CCNC: 2131 U/L — HIGH (ref 26–192)
CO2 SERPL-SCNC: 27 MMOL/L — SIGNIFICANT CHANGE UP (ref 22–31)
CREAT SERPL-MCNC: 0.67 MG/DL — SIGNIFICANT CHANGE UP (ref 0.5–1.3)
GLUCOSE SERPL-MCNC: 84 MG/DL — SIGNIFICANT CHANGE UP (ref 70–99)
HCG UR QL: NEGATIVE — SIGNIFICANT CHANGE UP
HCT VFR BLD CALC: 34 % — LOW (ref 34.5–45)
HGB BLD-MCNC: 10.8 G/DL — LOW (ref 11.5–15.5)
INR BLD: 1.09 RATIO — SIGNIFICANT CHANGE UP (ref 0.88–1.16)
MAGNESIUM SERPL-MCNC: 1.9 MG/DL — SIGNIFICANT CHANGE UP (ref 1.6–2.6)
MCHC RBC-ENTMCNC: 29.8 PG — SIGNIFICANT CHANGE UP (ref 27–34)
MCHC RBC-ENTMCNC: 31.8 GM/DL — LOW (ref 32–36)
MCV RBC AUTO: 93.7 FL — SIGNIFICANT CHANGE UP (ref 80–100)
PHOSPHATE SERPL-MCNC: 3.6 MG/DL — SIGNIFICANT CHANGE UP (ref 2.5–4.5)
PLATELET # BLD AUTO: 199 K/UL — SIGNIFICANT CHANGE UP (ref 150–400)
POTASSIUM SERPL-MCNC: 3.6 MMOL/L — SIGNIFICANT CHANGE UP (ref 3.5–5.3)
POTASSIUM SERPL-SCNC: 3.6 MMOL/L — SIGNIFICANT CHANGE UP (ref 3.5–5.3)
PROT SERPL-MCNC: 6.8 GM/DL — SIGNIFICANT CHANGE UP (ref 6–8.3)
PROTHROM AB SERPL-ACNC: 12.1 SEC — SIGNIFICANT CHANGE UP (ref 10–12.9)
RBC # BLD: 3.63 M/UL — LOW (ref 3.8–5.2)
RBC # FLD: 14.9 % — HIGH (ref 10.3–14.5)
SODIUM SERPL-SCNC: 140 MMOL/L — SIGNIFICANT CHANGE UP (ref 135–145)
WBC # BLD: 7.16 K/UL — SIGNIFICANT CHANGE UP (ref 3.8–10.5)
WBC # FLD AUTO: 7.16 K/UL — SIGNIFICANT CHANGE UP (ref 3.8–10.5)

## 2019-12-24 PROCEDURE — 93010 ELECTROCARDIOGRAM REPORT: CPT

## 2019-12-24 PROCEDURE — 95819 EEG AWAKE AND ASLEEP: CPT | Mod: 26

## 2019-12-24 PROCEDURE — 99223 1ST HOSP IP/OBS HIGH 75: CPT

## 2019-12-24 RX ORDER — HYDROMORPHONE HYDROCHLORIDE 2 MG/ML
1 INJECTION INTRAMUSCULAR; INTRAVENOUS; SUBCUTANEOUS ONCE
Refills: 0 | Status: DISCONTINUED | OUTPATIENT
Start: 2019-12-24 | End: 2019-12-24

## 2019-12-24 RX ORDER — LEVETIRACETAM 250 MG/1
500 TABLET, FILM COATED ORAL EVERY 12 HOURS
Refills: 0 | Status: DISCONTINUED | OUTPATIENT
Start: 2019-12-24 | End: 2019-12-26

## 2019-12-24 RX ORDER — ACETAMINOPHEN 500 MG
650 TABLET ORAL EVERY 6 HOURS
Refills: 0 | Status: DISCONTINUED | OUTPATIENT
Start: 2019-12-24 | End: 2019-12-28

## 2019-12-24 RX ORDER — HYDROMORPHONE HYDROCHLORIDE 2 MG/ML
1 INJECTION INTRAMUSCULAR; INTRAVENOUS; SUBCUTANEOUS EVERY 6 HOURS
Refills: 0 | Status: DISCONTINUED | OUTPATIENT
Start: 2019-12-24 | End: 2019-12-26

## 2019-12-24 RX ORDER — HEPARIN SODIUM 5000 [USP'U]/ML
5000 INJECTION INTRAVENOUS; SUBCUTANEOUS EVERY 8 HOURS
Refills: 0 | Status: DISCONTINUED | OUTPATIENT
Start: 2019-12-24 | End: 2019-12-24

## 2019-12-24 RX ORDER — SODIUM CHLORIDE 9 MG/ML
1000 INJECTION INTRAMUSCULAR; INTRAVENOUS; SUBCUTANEOUS
Refills: 0 | Status: DISCONTINUED | OUTPATIENT
Start: 2019-12-23 | End: 2019-12-28

## 2019-12-24 RX ORDER — OXYCODONE HYDROCHLORIDE 5 MG/1
5 TABLET ORAL EVERY 6 HOURS
Refills: 0 | Status: DISCONTINUED | OUTPATIENT
Start: 2019-12-24 | End: 2019-12-27

## 2019-12-24 RX ADMIN — OXYCODONE HYDROCHLORIDE 5 MILLIGRAM(S): 5 TABLET ORAL at 20:11

## 2019-12-24 RX ADMIN — HYDROMORPHONE HYDROCHLORIDE 1 MILLIGRAM(S): 2 INJECTION INTRAMUSCULAR; INTRAVENOUS; SUBCUTANEOUS at 21:10

## 2019-12-24 RX ADMIN — SODIUM CHLORIDE 100 MILLILITER(S): 9 INJECTION INTRAMUSCULAR; INTRAVENOUS; SUBCUTANEOUS at 02:05

## 2019-12-24 RX ADMIN — HYDROMORPHONE HYDROCHLORIDE 1 MILLIGRAM(S): 2 INJECTION INTRAMUSCULAR; INTRAVENOUS; SUBCUTANEOUS at 10:55

## 2019-12-24 RX ADMIN — LEVETIRACETAM 400 MILLIGRAM(S): 250 TABLET, FILM COATED ORAL at 17:19

## 2019-12-24 RX ADMIN — HYDROMORPHONE HYDROCHLORIDE 1 MILLIGRAM(S): 2 INJECTION INTRAMUSCULAR; INTRAVENOUS; SUBCUTANEOUS at 21:40

## 2019-12-24 RX ADMIN — SODIUM CHLORIDE 100 MILLILITER(S): 9 INJECTION INTRAMUSCULAR; INTRAVENOUS; SUBCUTANEOUS at 17:19

## 2019-12-24 RX ADMIN — HYDROMORPHONE HYDROCHLORIDE 1 MILLIGRAM(S): 2 INJECTION INTRAMUSCULAR; INTRAVENOUS; SUBCUTANEOUS at 14:47

## 2019-12-24 RX ADMIN — OXYCODONE HYDROCHLORIDE 5 MILLIGRAM(S): 5 TABLET ORAL at 11:45

## 2019-12-24 RX ADMIN — OXYCODONE HYDROCHLORIDE 5 MILLIGRAM(S): 5 TABLET ORAL at 19:41

## 2019-12-24 NOTE — CONSULT NOTE ADULT - ASSESSMENT
24 year old female patient with hx of poly substance abuse with 3 witnessed seizures    Benzodiazepine withdrawal Seizure    #Spiral tib/fib fracture    Hx of polysubstance abuse/ Depression 24 year old F with PMH of depression / polysubstance abuse (heroin and xanax), prior history of seizures thought to be benzo-withdrawal related, was on keppra for a short time; presented to the ED ON 12/23 after a witnessed seizure; pt left a local store then suddenly collapsed and started seizing, in ED patient had two more witnessed seizures, was found to have a spirl tib/fib fracture of the left lower extremity,     U-TOX + Benzo, opiates, THC. CT Head - no acute lesions, Pt was given Keppra 1000 mg IV x 1 .     # 3 seizures in quick succession; most likely Benzo withdrawal / Substance abuse related Seizure; EEG shows no epileptiform activity    # Spiral tib/fib fracture    - In regard to history of prior seizures, will continue keppra   - May Give Keppra 500 mg IV now before going for Surgery for tib/fib #, no contraindication for surgery, maintain seizure precautions post-op  - Contune Keppra 500 mg IV BID, change to oral once post-op   - After Surgery, psyche input regarding Benzo Detox    D/W Pt

## 2019-12-24 NOTE — CHART NOTE - NSCHARTNOTEFT_GEN_A_CORE
L tibial shaft fx.   Plan for OR 12/25 am.   NPO after midnight tonight.   Hold AC after midnight.   May give dose of heparin now if medically stable to do so, otherwise hold after midnight.   AM labs, including PT/PTT/INR, T+S  Needs Upreg in am, updated for 12/25

## 2019-12-24 NOTE — PROGRESS NOTE ADULT - ASSESSMENT
23 YO F with L tib/fib Fx  Pain control  NWB LLE  FU labs  FU neuro/psych consult  Plan for OR when patient is medically stable

## 2019-12-24 NOTE — PROGRESS NOTE ADULT - SUBJECTIVE AND OBJECTIVE BOX
Patient seen and examined. Patient is still lethargic this morning, barely able to arouse. Not able to answer questions or participate with exam.    Vital Signs Last 24 Hrs  T(C): 37.3 (24 Dec 2019 04:49), Max: 37.3 (24 Dec 2019 04:49)  T(F): 99.2 (24 Dec 2019 04:49), Max: 99.2 (24 Dec 2019 04:49)  HR: 71 (24 Dec 2019 06:00) (71 - 107)  BP: 99/63 (24 Dec 2019 06:00) (97/53 - 137/83)  BP(mean): 72 (24 Dec 2019 06:00) (64 - 96)  RR: 12 (24 Dec 2019 06:00) (12 - 21)  SpO2: 100% (24 Dec 2019 06:00) (98% - 100%)    Gen: NAD, lethargic  LLE: in trilam/KI  brisk cap refill, compartments soft

## 2019-12-24 NOTE — H&P ADULT - NSHPPHYSICALEXAM_GEN_ALL_CORE
Vital Signs Last 24 Hrs  T(C): 36.7 (23 Dec 2019 18:45), Max: 36.7 (23 Dec 2019 18:45)  T(F): 98.1 (23 Dec 2019 18:45), Max: 98.1 (23 Dec 2019 18:45)  HR: 89 (23 Dec 2019 22:31) (89 - 107)  BP: 108/62 (23 Dec 2019 22:31) (108/62 - 137/83)  BP(mean): 76 (23 Dec 2019 22:31) (76 - 96)  RR: 18 (23 Dec 2019 22:31) (18 - 18)  SpO2: 99% (23 Dec 2019 22:31) (98% - 100%)

## 2019-12-24 NOTE — H&P ADULT - HISTORY OF PRESENT ILLNESS
24 year old female patient with PMH of depression and polysubstance abuse(heroin and xanax) presented to the ED after a witnessed seizure. Patient currently post ictal and unable to provide pertinent narrative. History provided by sister and friend. According to family, pt left a local store when she suddenly collapsed and started seizing. It was noted that her left lower extremity was deformed after falling. Incident took place around 1800 hours on 12/23/19. Family endorsed almost daily xanax abuse with occasional heroin and alcohol. Patient last used xanax one day prior to arrival in the ED, along with alcohol. It is unclear when pt last used heroin. Patient last had a seizure one month prior, which was related to xanax withdrawal. All of pt's seizures in the past have been connected to abstaining from xanax. Patient in outpatient therapy at [facility] in [place] but has not been in for any sessions lately.     In the ED patient had two witnessed seizures and was found to have a spirl tib/fib fracture of the left lower extremity.

## 2019-12-24 NOTE — CONSULT NOTE ADULT - SUBJECTIVE AND OBJECTIVE BOX
24 y old  Female who presents with a chief complaint of Benzodiazepine withdrawal seizure    HPI:  24 year old F with PMH of depression / polysubstance abuse (heroin and xanax) presented to the ED ON 12/23 after a witnessed seizure. As per history provided by sister and friend, pt left a local store at 1800 hrs when she suddenly collapsed and started seizing, soon after was noted to have her left lower extremity  seemed deformed after falling.     Family reporstsed almost daily xanax abuse with occasional heroin and alcohol. Patient last used xanax one day prior to arrival in the ED, along with alcohol. It is unclear when pt last used heroin. Patient last had a seizure one month prior, which was related to xanax withdrawal. All of pt's seizures in the past have been connected to abstaining from xanax. Patient in outpatient therapy at [facility] in [place] but has not been in for any sessions lately.     In the ED patient had two witnessed seizures and was found to have a spirl tib/fib fracture of the left lower extremity, was given Keppra 1000 mg IV x 1 .       PAST MEDICAL & SURGICAL HISTORY:  Polysubstance abuse  Depression  Seizures  No significant past surgical history      FAMILY HISTORY:      Social Hx: Substance abuse, alcohol use +      MEDICATIONS  (STANDING):  sodium chloride 0.9%. 1000 milliLiter(s) (100 mL/Hr) IV Continuous <Continuous>       Allergies  No Known Allergies    Intolerances      ROS: Pertinent positives in HPI, all other ROS were reviewed and are negative.        Vital Signs Last 24 Hrs  T(C): 37.7 (24 Dec 2019 16:00), Max: 37.7 (24 Dec 2019 16:00)  T(F): 99.9 (24 Dec 2019 16:00), Max: 99.9 (24 Dec 2019 16:00)  HR: 73 (24 Dec 2019 15:00) (71 - 107)  BP: 92/50 (24 Dec 2019 15:00) (91/44 - 137/83)  BP(mean): 61 (24 Dec 2019 15:00) (54 - 96)  RR: 13 (24 Dec 2019 15:00) (12 - 24)  SpO2: 100% (24 Dec 2019 09:00) (98% - 100%)      Gen exam:  Normocephalic, in no distress.  HEENT: PERRLA, EOMI,   Neck: Supple.  Respiratory: Breath sounds are clear bilaterally  Cardiovascular: S1 and S2, regular   Extremities:  no edema  Vascular: Caritid Bruit - no  Musculoskeletal: no joint swelling/tenderness, no abnormal movements  Skin: No rashes    Neurological exam:  HF: A x O x 3. Appropriately interactive, normal affect. Speech fluent, No Aphasia or paraphasic errors. Naming /repetition intact   CN: TYLER, EOMI, VFF, facial sensation normal, no NLFD, tongue midline, Palate moves equally, SCM equal bilaterally  Motor: No pronator drift, Strength 5/5 in all 4 ext, normal bulk and tone, no tremor, rigidity or bradykinesia.    Sens: Intact to light touch     Reflexes: Symmetric and normal .  downgoing toes b/l  Coord:  No FNFA, dysmetria   Gait/Balance: Cannot test      Labs:   12-24    140  |  107  |  9   ----------------------------<  84  3.6   |  27  |  0.67    Ca    7.9<L>      24 Dec 2019 06:40  Phos  3.6     12-24  Mg     1.9     12-24    TPro  6.8  /  Alb  3.1<L>  /  TBili  0.3  /  DBili  x   /  AST  67<H>  /  ALT  31  /  AlkPhos  60  12-24                          10.8   7.16  )-----------( 199      ( 24 Dec 2019 06:40 )             34.0       Radiology:  - CT Head: < from: CT Head No Cont (12.23.19 @ 22:36) >  No acute intracranial bleeding, mass effect, or shift.     - EEG: < from: EEG Awake and Asleep (12.24.19 @ 08:30) >  EEG Summary/Classification:  This was an abnormal EEG due to the presence of:  -Mild generalized slowing  -Excess beta activity  EEG Impression/Clinical Correlate:  The findings are suggestive of mild diffuse cerebral dysfunction/encephalopathy.  Excess beta activity is most likely a medication effect from use of benzodiazepines.   No epileptiform activity was seen and no clinical events or seizures were recorded. Consider a repeat study if clinically indicated. 24 y old  Female who presents with a chief complaint of seizure    HPI:  24 year old F with PMH of depression / polysubstance abuse (heroin and xanax) presented to the ED ON 12/23 after a witnessed seizure. As per the patient, she has limited recall, she was in a store than she collapsed and fell, she had not taken her Xanax for 2 days. As per history provided by sister and friend, pt left a local store at 1800 hrs when she suddenly collapsed and started seizing, soon after her left lower extremity seemed deformed; pt was brought to ED, In the ED patient had two witnessed seizures, was found to have a spirl tib/fib fracture of the left lower extremity, Pt was given Keppra 1000 mg IV x 1 .     Pr reports she takes xanax everyday, also has occasional heroin and alcohol use, unclear when pt last used heroin.     Patient has history of prior seizures, she recalls at least 2, last seizure one month ago -  was related to xanax withdrawal. Patient was on Keppra previously, she d/c it as she was not having seizures, she munoz snot provide detailed history, she follows-up with [facility] in [place] but has not been in for any sessions lately.       PAST MEDICAL & SURGICAL HISTORY:  Polysubstance abuse  Depression  Seizures  No significant past surgical history      FAMILY HISTORY: unable to obatin      Social Hx: Substance abuse, alcohol use +, smoking 1 PPD      MEDICATIONS  (STANDING):  sodium chloride 0.9%. 1000 milliLiter(s) (100 mL/Hr) IV Continuous <Continuous>       Allergies  No Known Allergies    Intolerances      ROS: Pertinent positives in HPI, all other ROS were reviewed and are negative.        Vital Signs Last 24 Hrs  T(C): 37.7 (24 Dec 2019 16:00), Max: 37.7 (24 Dec 2019 16:00)  T(F): 99.9 (24 Dec 2019 16:00), Max: 99.9 (24 Dec 2019 16:00)  HR: 73 (24 Dec 2019 15:00) (71 - 107)  BP: 92/50 (24 Dec 2019 15:00) (91/44 - 137/83)  BP(mean): 61 (24 Dec 2019 15:00) (54 - 96)  RR: 13 (24 Dec 2019 15:00) (12 - 24)  SpO2: 100% (24 Dec 2019 09:00) (98% - 100%)      Gen exam:  Normocephalic, in no distress.  HEENT: PERRLA, EOMI,   Neck: Supple.  Respiratory: Breath sounds are clear bilaterally  Cardiovascular: S1 and S2, regular   Extremities: LLE in splint  Vascular: Caritid Bruit - no  Musculoskeletal:  LLE in splint due to fracture  Skin: No rashes      Neurological exam:  HF: A x O x 3. Speech fluent, No Aphasia or paraphasic errors. Naming intact, decreased concentration . attention  CN: TYLER, EOMI, VFF, facial sensation normal, no NLFD, tongue midline, Palate moves equally, SCM equal bilaterally  Motor: No pronator drift, Strength 5/5 in BUE anf RLE, no tremor, rigidity .    Sens: Intact to light touch     Reflexes: Symmetric and normal (Unable to test LLE) .  downgoing toes b/l  Coord:  No FNFA,   Gait/Balance: Cannot test      Labs:   12-24    140  |  107  |  9   ----------------------------<  84  3.6   |  27  |  0.67    Ca    7.9<L>      24 Dec 2019 06:40  Phos  3.6     12-24  Mg     1.9     12-24    TPro  6.8  /  Alb  3.1<L>  /  TBili  0.3  /  DBili  x   /  AST  67<H>  /  ALT  31  /  AlkPhos  60  12-24                          10.8   7.16  )-----------( 199      ( 24 Dec 2019 06:40 )             34.0       Radiology:  - CT Head: < from: CT Head No Cont (12.23.19 @ 22:36) >  No acute intracranial bleeding, mass effect, or shift.     - EEG: < from: EEG Awake and Asleep (12.24.19 @ 08:30) >  EEG Summary/Classification:  This was an abnormal EEG due to the presence of:  -Mild generalized slowing  -Excess beta activity  EEG Impression/Clinical Correlate:  The findings are suggestive of mild diffuse cerebral dysfunction/encephalopathy.  Excess beta activity is most likely a medication effect from use of benzodiazepines.   No epileptiform activity was seen and no clinical events or seizures were recorded. Consider a repeat study if clinically indicated.

## 2019-12-24 NOTE — PROGRESS NOTE ADULT - SUBJECTIVE AND OBJECTIVE BOX
PT is sleeping, munoz not cooperate, can not maintain alertness,  briefly  opens the eyes,  knocks the head negatively when  asked if she feels suicidal  or homicidal.  PT is latergic and non cooperative.  psych interview cannot be fully conducted.   Called  mother Bisi Hillman [phone] and left a message.   withdrawal seizure treat met per primary team.    C&L will f/u for full assessment once pt is alert  and  able to communicate. PT is sleeping, munoz not cooperate, can not maintain alertness,  briefly  opens the eyes,  knocks the head negatively when  asked if she feels suicidal  or homicidal.  PT is latergic and non cooperative.  psych interview cannot be fully conducted.   Called  mother Bisi Hillman [phone] and left a message.   She returned zafar and reports that pt has extensive substance abuse hx, hx fo depression and hx fo 2 SA, last  one this summer when she  was hospitalised in [facility] and after that completed rehab in [facility].  Pt has know hx fo withdrawal seizure  when  abusing  benzos     Withdrawal seizure treat met per primary team.    C&L will f/u for full assessment once pt is alert  and  able to communicate.

## 2019-12-24 NOTE — H&P ADULT - ASSESSMENT
24 year old female patient with hx of poly substance abuse with 3 witnessed seizures    -Admit to SICU      #Benzodiazepine withdrawal Seizure  -pt with reported hx of almost daily xanax abuse  -had 3 witnessed seizures  -get morning  EEG  -s/p keppra and ativan in the ED  -will give ativan for more breakthrough seizures  -neuro checks  Neurology consult    #Spiral tib/fib fracture  -make NPO  -patient may proceed with planned surgical procedure when alert and able to consent  -no contraindication to surgery  -RCRI class 1 risk  -Pt optimized for surgical intervnetion    #Hx of polysubstance abuse/ Depression  -get psychiatry evaluation  -social work consult    #other issues  -clarify med rec when pt more alert/coherent     #DVT  -to be addressed post op  -scds

## 2019-12-24 NOTE — PROGRESS NOTE ADULT - SUBJECTIVE AND OBJECTIVE BOX
HPI: 24 year old female patient with PMH of depression and polysubstance abuse(heroin and xanax) presented to the ED after a witnessed seizure. Patient currently post ictal and unable to provide pertinent narrative. History provided by sister and friend. According to family, pt left a local store when she suddenly collapsed and started seizing. It was noted that her left lower extremity was deformed after falling. Incident took place around 1800 hours on 19. Family endorsed almost daily xanax abuse with occasional heroin and alcohol. Patient last used xanax one day prior to arrival in the ED, along with alcohol. It is unclear when pt last used heroin. Patient last had a seizure one month prior, which was related to xanax withdrawal. All of pt's seizures in the past have been connected to abstaining from xanax. Patient in outpatient therapy at [facility] in [place] but has not been in for any sessions lately.     In the ED patient had two witnessed seizures and was found to have a spirl tib/fib fracture of the left lower extremity.    : pt sleepy but responds to questions  EEG negative for seizures  CPK 2131      PHYSICAL EXAM:    Daily     Daily     ICU Vital Signs Last 24 Hrs  T(C): 37.3 (24 Dec 2019 04:49), Max: 37.3 (24 Dec 2019 04:49)  T(F): 99.2 (24 Dec 2019 04:49), Max: 99.2 (24 Dec 2019 04:49)  HR: 76 (24 Dec 2019 07:00) (71 - 107)  BP: 100/60 (24 Dec 2019 07:00) (97/53 - 137/83)  BP(mean): 71 (24 Dec 2019 07:00) (64 - 96)  ABP: --  ABP(mean): --  RR: 18 (24 Dec 2019 07:00) (12 - 21)  SpO2: 100% (24 Dec 2019 07:00) (98% - 100%)      Constitutional: Weak appearing  HEENT: Atraumatic, MAKI, Normal, No congestion  Respiratory: Breath Sounds normal, no rhonchi/wheeze  Cardiovascular: N S1S2;   Gastrointestinal: Abdomen soft, non tender, Bowel Sounds present  Extremities: No edema, peripheral pulses present  Neurological: sleepy  Skin: Non cellulitic, no rash, ulcers  Lymph Nodes: No lymphadenopathy noted  Back: No CVA tenderness   Musculoskeletal: non tender  Breasts: Deferred  Genitourinary: deferred  Rectal: Deferred                          10.8   7.16  )-----------( 199      ( 24 Dec 2019 06:40 )             34.0       CBC Full  -  ( 24 Dec 2019 06:40 )  WBC Count : 7.16 K/uL  RBC Count : 3.63 M/uL  Hemoglobin : 10.8 g/dL  Hematocrit : 34.0 %  Platelet Count - Automated : 199 K/uL  Mean Cell Volume : 93.7 fl  Mean Cell Hemoglobin : 29.8 pg  Mean Cell Hemoglobin Concentration : 31.8 gm/dL  Auto Neutrophil # : x  Auto Lymphocyte # : x  Auto Monocyte # : x  Auto Eosinophil # : x  Auto Basophil # : x  Auto Neutrophil % : x  Auto Lymphocyte % : x  Auto Monocyte % : x  Auto Eosinophil % : x  Auto Basophil % : x      12-24    140  |  107  |  9   ----------------------------<  84  3.6   |  27  |  0.67    Ca    7.9<L>      24 Dec 2019 06:40  Phos  3.6     12-24  Mg     1.9     12-24    TPro  6.8  /  Alb  3.1<L>  /  TBili  0.3  /  DBili  x   /  AST  67<H>  /  ALT  31  /  AlkPhos  60  12-24      LIVER FUNCTIONS - ( 24 Dec 2019 06:40 )  Alb: 3.1 g/dL / Pro: 6.8 gm/dL / ALK PHOS: 60 U/L / ALT: 31 U/L / AST: 67 U/L / GGT: x             PT/INR - ( 23 Dec 2019 19:05 )   PT: 10.5 sec;   INR: 0.95 ratio         PTT - ( 24 Dec 2019 06:40 )  PTT:30.6 sec    CARDIAC MARKERS ( 24 Dec 2019 06:40 )  x     / x     / 2131 U/L / x     / x      CARDIAC MARKERS ( 23 Dec 2019 19:05 )  <0.015 ng/mL / x     / x     / x     / x            Urinalysis Basic - ( 23 Dec 2019 21:39 )    Color: Yellow / Appearance: Clear / S.030 / pH: x  Gluc: x / Ketone: Trace  / Bili: Negative / Urobili: Negative mg/dL   Blood: x / Protein: Negative mg/dL / Nitrite: Negative   Leuk Esterase: Negative / RBC: 6-10 /HPF / WBC 3-5   Sq Epi: x / Non Sq Epi: Few / Bacteria: Few            MEDICATIONS  (STANDING):  sodium chloride 0.9%. 1000 milliLiter(s) (100 mL/Hr) IV Continuous <Continuous> HPI: 24 year old female patient with PMH of depression and polysubstance abuse(heroin and xanax) presented to the ED after a witnessed seizure. Patient currently post ictal and unable to provide pertinent narrative. History provided by sister and friend. According to family, pt left a local store when she suddenly collapsed and started seizing. It was noted that her left lower extremity was deformed after falling. Incident took place around 1800 hours on 19. Family endorsed almost daily xanax abuse with occasional heroin and alcohol. Patient last used xanax one day prior to arrival in the ED, along with alcohol. It is unclear when pt last used heroin. Patient last had a seizure one month prior, which was related to xanax withdrawal. All of pt's seizures in the past have been connected to abstaining from xanax. Patient in outpatient therapy at [facility] in [place] but has not been in for any sessions lately.     In the ED patient had two witnessed seizures and was found to have a spirl tib/fib fracture of the left lower extremity.    : pt sleepy but responds to questions  EEG negative for seizures  CPK 2131  iv pain meds started      PHYSICAL EXAM:    Daily     Daily     ICU Vital Signs Last 24 Hrs  T(C): 37.3 (24 Dec 2019 04:49), Max: 37.3 (24 Dec 2019 04:49)  T(F): 99.2 (24 Dec 2019 04:49), Max: 99.2 (24 Dec 2019 04:49)  HR: 76 (24 Dec 2019 07:00) (71 - 107)  BP: 100/60 (24 Dec 2019 07:00) (97/53 - 137/83)  BP(mean): 71 (24 Dec 2019 07:00) (64 - 96)  ABP: --  ABP(mean): --  RR: 18 (24 Dec 2019 07:00) (12 - 21)  SpO2: 100% (24 Dec 2019 07:00) (98% - 100%)      Constitutional: Weak appearing  HEENT: Atraumatic, MAKI, Normal, No congestion  Respiratory: Breath Sounds normal, no rhonchi/wheeze  Cardiovascular: N S1S2;   Gastrointestinal: Abdomen soft, non tender, Bowel Sounds present  Extremities: No edema, peripheral pulses present  Neurological: sleepy  Skin: Non cellulitic, no rash, ulcers  Lymph Nodes: No lymphadenopathy noted  Back: No CVA tenderness   Musculoskeletal: non tender  Breasts: Deferred  Genitourinary: deferred  Rectal: Deferred                          10.8   7.16  )-----------( 199      ( 24 Dec 2019 06:40 )             34.0       CBC Full  -  ( 24 Dec 2019 06:40 )  WBC Count : 7.16 K/uL  RBC Count : 3.63 M/uL  Hemoglobin : 10.8 g/dL  Hematocrit : 34.0 %  Platelet Count - Automated : 199 K/uL  Mean Cell Volume : 93.7 fl  Mean Cell Hemoglobin : 29.8 pg  Mean Cell Hemoglobin Concentration : 31.8 gm/dL  Auto Neutrophil # : x  Auto Lymphocyte # : x  Auto Monocyte # : x  Auto Eosinophil # : x  Auto Basophil # : x  Auto Neutrophil % : x  Auto Lymphocyte % : x  Auto Monocyte % : x  Auto Eosinophil % : x  Auto Basophil % : x      12-24    140  |  107  |  9   ----------------------------<  84  3.6   |  27  |  0.67    Ca    7.9<L>      24 Dec 2019 06:40  Phos  3.6     12-24  Mg     1.9     12-24    TPro  6.8  /  Alb  3.1<L>  /  TBili  0.3  /  DBili  x   /  AST  67<H>  /  ALT  31  /  AlkPhos  60  12-24      LIVER FUNCTIONS - ( 24 Dec 2019 06:40 )  Alb: 3.1 g/dL / Pro: 6.8 gm/dL / ALK PHOS: 60 U/L / ALT: 31 U/L / AST: 67 U/L / GGT: x             PT/INR - ( 23 Dec 2019 19:05 )   PT: 10.5 sec;   INR: 0.95 ratio         PTT - ( 24 Dec 2019 06:40 )  PTT:30.6 sec    CARDIAC MARKERS ( 24 Dec 2019 06:40 )  x     / x     / 2131 U/L / x     / x      CARDIAC MARKERS ( 23 Dec 2019 19:05 )  <0.015 ng/mL / x     / x     / x     / x            Urinalysis Basic - ( 23 Dec 2019 21:39 )    Color: Yellow / Appearance: Clear / S.030 / pH: x  Gluc: x / Ketone: Trace  / Bili: Negative / Urobili: Negative mg/dL   Blood: x / Protein: Negative mg/dL / Nitrite: Negative   Leuk Esterase: Negative / RBC: 6-10 /HPF / WBC 3-5   Sq Epi: x / Non Sq Epi: Few / Bacteria: Few            MEDICATIONS  (STANDING):  sodium chloride 0.9%. 1000 milliLiter(s) (100 mL/Hr) IV Continuous <Continuous>

## 2019-12-24 NOTE — PROGRESS NOTE ADULT - ASSESSMENT
24 year old female patient with hx of poly substance abuse with 3 witnessed seizures; admitted with:       #Benzodiazepine withdrawal Seizure  -pt with reported hx of almost daily xanax abuse  -had 3 witnessed seizures  -Neg EEG  -s/p keppra and ativan in the ED  -will give ativan for more breakthrough seizures  -neuro checks  Neurology consult  awaited    #Spiral tib/fib fracture  -patient may proceed with planned surgical procedure when alert and able to consent  -no contraindication to surgery  -RCRI class 1 risk  -Pt optimized for surgical intervention    #Hx of polysubstance abuse/ Depression  - psychiatry could not evaluate the pt as she was sleepy and uncooperative  -social work consult    #other issues  -clarify med rec when pt more alert/coherent     # Elevated CPK 2131: cont iv fluids; reheck in am    #DVT  -heparin s/q  -scds 24 year old female patient with hx of poly substance abuse with 3 witnessed seizures; admitted with:       #Benzodiazepine withdrawal Seizure  -pt with reported hx of almost daily xanax abuse  -had 3 witnessed seizures  -Neg EEG  -s/p keppra and ativan in the ED  -will give ativan for more breakthrough seizures  -neuro checks  Neurology consult  awaited  add tylenol, oxycodone, iv dilaudid prn    #Spiral tib/fib fracture  -patient may proceed with planned surgical procedure when alert and able to consent  -no contraindication to surgery  -RCRI class 1 risk  -Pt optimized for surgical intervention    #Hx of polysubstance abuse/ Depression  - psychiatry could not evaluate the pt as she was sleepy and uncooperative  -social work consult    #other issues  -clarify med rec when pt more alert/coherent     # Elevated CPK 2131: cont iv fluids; reheck in am    #DVT  -heparin s/q  -scds

## 2019-12-25 ENCOUNTER — TRANSCRIPTION ENCOUNTER (OUTPATIENT)
Age: 24
End: 2019-12-25

## 2019-12-25 LAB
ANION GAP SERPL CALC-SCNC: 4 MMOL/L — LOW (ref 5–17)
ANION GAP SERPL CALC-SCNC: 4 MMOL/L — LOW (ref 5–17)
APTT BLD: 34.7 SEC — SIGNIFICANT CHANGE UP (ref 27.5–36.3)
BUN SERPL-MCNC: 4 MG/DL — LOW (ref 7–23)
BUN SERPL-MCNC: 5 MG/DL — LOW (ref 7–23)
CALCIUM SERPL-MCNC: 7.9 MG/DL — LOW (ref 8.5–10.1)
CALCIUM SERPL-MCNC: 8.6 MG/DL — SIGNIFICANT CHANGE UP (ref 8.5–10.1)
CHLORIDE SERPL-SCNC: 105 MMOL/L — SIGNIFICANT CHANGE UP (ref 96–108)
CHLORIDE SERPL-SCNC: 106 MMOL/L — SIGNIFICANT CHANGE UP (ref 96–108)
CK SERPL-CCNC: 1391 U/L — HIGH (ref 26–192)
CO2 SERPL-SCNC: 28 MMOL/L — SIGNIFICANT CHANGE UP (ref 22–31)
CO2 SERPL-SCNC: 28 MMOL/L — SIGNIFICANT CHANGE UP (ref 22–31)
CREAT SERPL-MCNC: 0.57 MG/DL — SIGNIFICANT CHANGE UP (ref 0.5–1.3)
CREAT SERPL-MCNC: 0.64 MG/DL — SIGNIFICANT CHANGE UP (ref 0.5–1.3)
GLUCOSE SERPL-MCNC: 104 MG/DL — HIGH (ref 70–99)
GLUCOSE SERPL-MCNC: 131 MG/DL — HIGH (ref 70–99)
HCT VFR BLD CALC: 30.6 % — LOW (ref 34.5–45)
HCT VFR BLD CALC: 31.6 % — LOW (ref 34.5–45)
HGB BLD-MCNC: 10.4 G/DL — LOW (ref 11.5–15.5)
HGB BLD-MCNC: 10.5 G/DL — LOW (ref 11.5–15.5)
INR BLD: 1.12 RATIO — SIGNIFICANT CHANGE UP (ref 0.88–1.16)
MCHC RBC-ENTMCNC: 30.1 PG — SIGNIFICANT CHANGE UP (ref 27–34)
MCHC RBC-ENTMCNC: 30.8 PG — SIGNIFICANT CHANGE UP (ref 27–34)
MCHC RBC-ENTMCNC: 33.2 GM/DL — SIGNIFICANT CHANGE UP (ref 32–36)
MCHC RBC-ENTMCNC: 34 GM/DL — SIGNIFICANT CHANGE UP (ref 32–36)
MCV RBC AUTO: 90.5 FL — SIGNIFICANT CHANGE UP (ref 80–100)
MCV RBC AUTO: 90.5 FL — SIGNIFICANT CHANGE UP (ref 80–100)
PLATELET # BLD AUTO: 191 K/UL — SIGNIFICANT CHANGE UP (ref 150–400)
PLATELET # BLD AUTO: 219 K/UL — SIGNIFICANT CHANGE UP (ref 150–400)
POTASSIUM SERPL-MCNC: 3.2 MMOL/L — LOW (ref 3.5–5.3)
POTASSIUM SERPL-MCNC: 3.8 MMOL/L — SIGNIFICANT CHANGE UP (ref 3.5–5.3)
POTASSIUM SERPL-SCNC: 3.2 MMOL/L — LOW (ref 3.5–5.3)
POTASSIUM SERPL-SCNC: 3.8 MMOL/L — SIGNIFICANT CHANGE UP (ref 3.5–5.3)
PROTHROM AB SERPL-ACNC: 12.5 SEC — SIGNIFICANT CHANGE UP (ref 10–12.9)
RBC # BLD: 3.38 M/UL — LOW (ref 3.8–5.2)
RBC # BLD: 3.49 M/UL — LOW (ref 3.8–5.2)
RBC # FLD: 14.5 % — SIGNIFICANT CHANGE UP (ref 10.3–14.5)
RBC # FLD: 14.6 % — HIGH (ref 10.3–14.5)
SODIUM SERPL-SCNC: 137 MMOL/L — SIGNIFICANT CHANGE UP (ref 135–145)
SODIUM SERPL-SCNC: 138 MMOL/L — SIGNIFICANT CHANGE UP (ref 135–145)
WBC # BLD: 5.03 K/UL — SIGNIFICANT CHANGE UP (ref 3.8–10.5)
WBC # BLD: 7.51 K/UL — SIGNIFICANT CHANGE UP (ref 3.8–10.5)
WBC # FLD AUTO: 5.03 K/UL — SIGNIFICANT CHANGE UP (ref 3.8–10.5)
WBC # FLD AUTO: 7.51 K/UL — SIGNIFICANT CHANGE UP (ref 3.8–10.5)

## 2019-12-25 PROCEDURE — 76000 FLUOROSCOPY <1 HR PHYS/QHP: CPT | Mod: 26

## 2019-12-25 PROCEDURE — 27759 TREATMENT OF TIBIA FRACTURE: CPT | Mod: LT

## 2019-12-25 RX ORDER — HYDROMORPHONE HYDROCHLORIDE 2 MG/ML
0.5 INJECTION INTRAMUSCULAR; INTRAVENOUS; SUBCUTANEOUS EVERY 4 HOURS
Refills: 0 | Status: DISCONTINUED | OUTPATIENT
Start: 2019-12-25 | End: 2019-12-27

## 2019-12-25 RX ORDER — HYDROMORPHONE HYDROCHLORIDE 2 MG/ML
0.5 INJECTION INTRAMUSCULAR; INTRAVENOUS; SUBCUTANEOUS
Refills: 0 | Status: DISCONTINUED | OUTPATIENT
Start: 2019-12-25 | End: 2019-12-25

## 2019-12-25 RX ORDER — SODIUM CHLORIDE 9 MG/ML
1000 INJECTION INTRAMUSCULAR; INTRAVENOUS; SUBCUTANEOUS
Refills: 0 | Status: DISCONTINUED | OUTPATIENT
Start: 2019-12-25 | End: 2019-12-25

## 2019-12-25 RX ORDER — SENNA PLUS 8.6 MG/1
2 TABLET ORAL AT BEDTIME
Refills: 0 | Status: DISCONTINUED | OUTPATIENT
Start: 2019-12-25 | End: 2019-12-28

## 2019-12-25 RX ORDER — OXYCODONE HYDROCHLORIDE 5 MG/1
5 TABLET ORAL EVERY 4 HOURS
Refills: 0 | Status: DISCONTINUED | OUTPATIENT
Start: 2019-12-25 | End: 2019-12-28

## 2019-12-25 RX ORDER — HYDROMORPHONE HYDROCHLORIDE 2 MG/ML
1 INJECTION INTRAMUSCULAR; INTRAVENOUS; SUBCUTANEOUS ONCE
Refills: 0 | Status: DISCONTINUED | OUTPATIENT
Start: 2019-12-25 | End: 2019-12-25

## 2019-12-25 RX ORDER — MAGNESIUM HYDROXIDE 400 MG/1
30 TABLET, CHEWABLE ORAL DAILY
Refills: 0 | Status: DISCONTINUED | OUTPATIENT
Start: 2019-12-25 | End: 2019-12-28

## 2019-12-25 RX ORDER — POLYETHYLENE GLYCOL 3350 17 G/17G
17 POWDER, FOR SOLUTION ORAL DAILY
Refills: 0 | Status: DISCONTINUED | OUTPATIENT
Start: 2019-12-25 | End: 2019-12-28

## 2019-12-25 RX ORDER — OXYCODONE HYDROCHLORIDE 5 MG/1
10 TABLET ORAL EVERY 4 HOURS
Refills: 0 | Status: DISCONTINUED | OUTPATIENT
Start: 2019-12-25 | End: 2019-12-27

## 2019-12-25 RX ORDER — POTASSIUM CHLORIDE 20 MEQ
10 PACKET (EA) ORAL
Refills: 0 | Status: COMPLETED | OUTPATIENT
Start: 2019-12-25 | End: 2019-12-25

## 2019-12-25 RX ORDER — ENOXAPARIN SODIUM 100 MG/ML
40 INJECTION SUBCUTANEOUS DAILY
Refills: 0 | Status: DISCONTINUED | OUTPATIENT
Start: 2019-12-26 | End: 2019-12-28

## 2019-12-25 RX ORDER — OXYCODONE HYDROCHLORIDE 5 MG/1
10 TABLET ORAL ONCE
Refills: 0 | Status: DISCONTINUED | OUTPATIENT
Start: 2019-12-25 | End: 2019-12-25

## 2019-12-25 RX ORDER — ONDANSETRON 8 MG/1
4 TABLET, FILM COATED ORAL EVERY 6 HOURS
Refills: 0 | Status: DISCONTINUED | OUTPATIENT
Start: 2019-12-25 | End: 2019-12-28

## 2019-12-25 RX ORDER — ONDANSETRON 8 MG/1
4 TABLET, FILM COATED ORAL ONCE
Refills: 0 | Status: DISCONTINUED | OUTPATIENT
Start: 2019-12-25 | End: 2019-12-25

## 2019-12-25 RX ORDER — PANTOPRAZOLE SODIUM 20 MG/1
40 TABLET, DELAYED RELEASE ORAL
Refills: 0 | Status: DISCONTINUED | OUTPATIENT
Start: 2019-12-25 | End: 2019-12-28

## 2019-12-25 RX ORDER — ACETAMINOPHEN 500 MG
1000 TABLET ORAL ONCE
Refills: 0 | Status: COMPLETED | OUTPATIENT
Start: 2019-12-25 | End: 2019-12-25

## 2019-12-25 RX ORDER — CEFAZOLIN SODIUM 1 G
2000 VIAL (EA) INJECTION EVERY 8 HOURS
Refills: 0 | Status: COMPLETED | OUTPATIENT
Start: 2019-12-25 | End: 2019-12-26

## 2019-12-25 RX ORDER — KETOROLAC TROMETHAMINE 30 MG/ML
30 SYRINGE (ML) INJECTION ONCE
Refills: 0 | Status: DISCONTINUED | OUTPATIENT
Start: 2019-12-25 | End: 2019-12-25

## 2019-12-25 RX ADMIN — OXYCODONE HYDROCHLORIDE 10 MILLIGRAM(S): 5 TABLET ORAL at 12:36

## 2019-12-25 RX ADMIN — Medication 400 MILLIGRAM(S): at 12:07

## 2019-12-25 RX ADMIN — Medication 30 MILLIGRAM(S): at 12:36

## 2019-12-25 RX ADMIN — SODIUM CHLORIDE 100 MILLILITER(S): 9 INJECTION INTRAMUSCULAR; INTRAVENOUS; SUBCUTANEOUS at 12:35

## 2019-12-25 RX ADMIN — HYDROMORPHONE HYDROCHLORIDE 0.5 MILLIGRAM(S): 2 INJECTION INTRAMUSCULAR; INTRAVENOUS; SUBCUTANEOUS at 12:21

## 2019-12-25 RX ADMIN — OXYCODONE HYDROCHLORIDE 10 MILLIGRAM(S): 5 TABLET ORAL at 12:06

## 2019-12-25 RX ADMIN — Medication 30 MILLIGRAM(S): at 12:08

## 2019-12-25 RX ADMIN — Medication 100 MILLIGRAM(S): at 17:51

## 2019-12-25 RX ADMIN — LEVETIRACETAM 400 MILLIGRAM(S): 250 TABLET, FILM COATED ORAL at 17:51

## 2019-12-25 RX ADMIN — Medication 1000 MILLIGRAM(S): at 12:36

## 2019-12-25 RX ADMIN — HYDROMORPHONE HYDROCHLORIDE 1 MILLIGRAM(S): 2 INJECTION INTRAMUSCULAR; INTRAVENOUS; SUBCUTANEOUS at 05:04

## 2019-12-25 RX ADMIN — HYDROMORPHONE HYDROCHLORIDE 0.5 MILLIGRAM(S): 2 INJECTION INTRAMUSCULAR; INTRAVENOUS; SUBCUTANEOUS at 12:10

## 2019-12-25 RX ADMIN — HYDROMORPHONE HYDROCHLORIDE 0.5 MILLIGRAM(S): 2 INJECTION INTRAMUSCULAR; INTRAVENOUS; SUBCUTANEOUS at 12:35

## 2019-12-25 RX ADMIN — LEVETIRACETAM 400 MILLIGRAM(S): 250 TABLET, FILM COATED ORAL at 05:03

## 2019-12-25 RX ADMIN — HYDROMORPHONE HYDROCHLORIDE 1 MILLIGRAM(S): 2 INJECTION INTRAMUSCULAR; INTRAVENOUS; SUBCUTANEOUS at 12:00

## 2019-12-25 RX ADMIN — Medication 650 MILLIGRAM(S): at 21:26

## 2019-12-25 RX ADMIN — HYDROMORPHONE HYDROCHLORIDE 0.5 MILLIGRAM(S): 2 INJECTION INTRAMUSCULAR; INTRAVENOUS; SUBCUTANEOUS at 12:42

## 2019-12-25 RX ADMIN — HYDROMORPHONE HYDROCHLORIDE 1 MILLIGRAM(S): 2 INJECTION INTRAMUSCULAR; INTRAVENOUS; SUBCUTANEOUS at 12:36

## 2019-12-25 RX ADMIN — HYDROMORPHONE HYDROCHLORIDE 0.5 MILLIGRAM(S): 2 INJECTION INTRAMUSCULAR; INTRAVENOUS; SUBCUTANEOUS at 12:30

## 2019-12-25 RX ADMIN — HYDROMORPHONE HYDROCHLORIDE 1 MILLIGRAM(S): 2 INJECTION INTRAMUSCULAR; INTRAVENOUS; SUBCUTANEOUS at 18:28

## 2019-12-25 NOTE — DISCHARGE NOTE PROVIDER - NSDCMRMEDTOKEN_GEN_ALL_CORE_FT
acetaminophen 325 mg oral tablet: 2 tab(s) orally every 6 hours, As needed, Temp greater or equal to 38C (100.4F), Mild Pain (1 - 3)  Aspirin Enteric Coated 325 mg oral delayed release tablet: 1 tab(s) orally 2 times a day with food  HYDROmorphone 2 mg oral tablet: 1 tab(s) orally every 6 hours, As Needed -Moderate Pain (4 - 6) MDD:8 mg  levETIRAcetam 500 mg oral tablet: 1 tab(s) orally 2 times a day  oxyCODONE 5 mg oral tablet: 1 tab(s) orally every 4 hours, As needed, Mild Pain (1 - 3) MDD:20 mg  venlafaxine 37.5 mg oral capsule, extended release: 1 cap(s) orally once a day

## 2019-12-25 NOTE — DISCHARGE NOTE PROVIDER - NSDCCPCAREPLAN_GEN_ALL_CORE_FT
PRINCIPAL DISCHARGE DIAGNOSIS  Diagnosis: Seizures  Assessment and Plan of Treatment:       SECONDARY DISCHARGE DIAGNOSES  Diagnosis: Leg fracture, left  Assessment and Plan of Treatment:

## 2019-12-25 NOTE — DISCHARGE NOTE PROVIDER - CARE PROVIDER_API CALL
Maurice West (DO)  Orthopaedic Surgery  [address] [place] [address]  Phone: [phone]  Fax: [phone]  Follow Up Time: Maurice West (DO)  Orthopaedic Surgery  [address] [place] [address]  Phone: [phone]  Fax: [phone]  Follow Up Time:

## 2019-12-25 NOTE — PROGRESS NOTE ADULT - SUBJECTIVE AND OBJECTIVE BOX
Attempted follow up psych eval today, Pt  not in room and was informed Pt in surgery.  Please contact psych when awake.

## 2019-12-25 NOTE — PROGRESS NOTE ADULT - SUBJECTIVE AND OBJECTIVE BOX
Orthopedic Post Op Check Note    Patient seen and examined at bedside, resting comfortably. Patient's with pain, but pain controlled.  Denies any numbness/tingling.  Patient tolerated procedure well    VITAL SIGNS  T(C): 36.6 (19 @ 12:45), Max: 37.7 (19 @ 16:00)  HR: 78 (19 @ 12:53) (67 - 85)  BP: 115/75 (19 @ 12:53) (90/48 - 121/69)  RR: 16 (19 @ 12:53) (10 - 25)  SpO2: 100% (19 @ 12:53) (97% - 100%)    LABS:                        10.5   7.51  )-----------( 191      ( 25 Dec 2019 12:44 )             31.6         138  |  106  |  4<L>  ----------------------------<  131<H>  3.8   |  28  |  0.64    Ca    7.9<L>      25 Dec 2019 12:44  Phos  3.6       Mg     1.9         TPro  6.8  /  Alb  3.1<L>  /  TBili  0.3  /  DBili  x   /  AST  67<H>  /  ALT  31  /  AlkPhos  60      PT/INR - ( 25 Dec 2019 06:23 )   PT: 12.5 sec;   INR: 1.12 ratio         PTT - ( 25 Dec 2019 06:23 )  PTT:34.7 sec  Urinalysis Basic - ( 23 Dec 2019 21:39 )    Color: Yellow / Appearance: Clear / S.030 / pH: x  Gluc: x / Ketone: Trace  / Bili: Negative / Urobili: Negative mg/dL   Blood: x / Protein: Negative mg/dL / Nitrite: Negative   Leuk Esterase: Negative / RBC: 6-10 /HPF / WBC 3-5   Sq Epi: x / Non Sq Epi: Few / Bacteria: Few          PHYSICAL EXAM  GEN: NAD  LLE:  Skin: Dressing clean/dry/intact  Compartments soft and compressible  Calves nontender  +TA/EHL/FHL/GSC  Nonpainful passive stretch of great toe  L2-S1 SILT  + DP pulses      Assessment:  24y Female s/p left tibia IMN POD #0    -Pain control, patient has history of IV drug abuse may require greater pain control  -DVT ppx, anticoag team consutled  -WBAT/OOB with assistance as needed  - Patient needs CAM boot  -PT/OT  -Ice and elevate  -Encourage incentive spirometry  -DC planning  -Will discuss with attending and will advise if plan changes.

## 2019-12-25 NOTE — BRIEF OPERATIVE NOTE - NSICDXBRIEFPROCEDURE_GEN_ALL_CORE_FT
PROCEDURES:  Intramedullary rodding for fracture of tibial shaft 25-Dec-2019 12:02:06  Landon Masterson

## 2019-12-25 NOTE — DISCHARGE NOTE PROVIDER - HOSPITAL COURSE
PHYSICAL EXAM:            T(C): 36.7 (28 Dec 2019 10:59), Max: 36.9 (27 Dec 2019 17:11)    T(F): 98 (28 Dec 2019 10:59), Max: 98.5 (27 Dec 2019 17:11)    HR: 102 (28 Dec 2019 10:59) (96 - 102)    BP: 131/72 (28 Dec 2019 10:59) (118/73 - 131/72)    BP(mean): 85 (28 Dec 2019 10:59) (85 - 85)    RR: 19 (28 Dec 2019 10:59) (16 - 19)    SpO2: 99% (28 Dec 2019 10:59) (96% - 99%)            Constitutional: Well appearing    HEENT: Atraumatic, MAKI, Normal, No congestion    Respiratory: Breath Sounds normal, no rhonchi/wheeze    Cardiovascular: N S1S2;     Gastrointestinal: Abdomen soft, non tender, Bowel Sounds present    Extremities: No edema, peripheral pulses present    Neurological: AAO x 3, no gross focal motor deficits    Skin: Non cellulitic, no rash, ulcers    Lymph Nodes: No lymphadenopathy noted    Back: No CVA tenderness     Musculoskeletal: non tender    Breasts: Deferred    Genitourinary: deferred    Rectal: Deferred            24 year old female patient with hx of poly substance abuse with 3 witnessed seizures; admitted with:             #Benzodiazepine withdrawal Seizure    -pt with reported hx of almost daily xanax abuse    -had 3 witnessed seizures    -Neg EEG    -cont Keppra bid as per neuro    -will give ativan for more breakthrough seizures    Neurology consult  appreciated; NO DRIVING    tylenol, oxycodone, dilaudid prn        #Spiral tib/fib fracture left leg    -S/P left IMN     -no contraindication to surgery    -RCRI class 1 risk    -Pt optimized for surgical intervention    change to po dilaudid        #Hx of polysubstance abuse/ Depression    psych consult appreciated; Psych cleared her for discharge    cont effexor        # Elevated CPK 2131:  drink plenty of fluids; trending down    check with pcp in 2 days        # Low grade temp of 100.3: afebrile today, monitor, no recurrence        #DVT PPX    Lovenox changed to asa 325 mg po bid upon discharge         d/c home    CAM boot, WBAT        f/u with pcp in 1-2 days    f/u with ortho in 1 week        total time spent 45 min

## 2019-12-25 NOTE — DISCHARGE NOTE PROVIDER - NSDCFUADDINST_GEN_ALL_CORE_FT
Tibial IM Nail DC Instructions:    1.	Resume previous diet, regular or diabetic as appropriate  2.	Weight Bearing as Tolerated with assistance   3.	Continue DVT/PE Prophylaxis. Lovenox SC***. See Med Rec for Duration and dose.  4.	PT daily  5.	Follow up with Orthopedic Surgeon Dr. West in 14 Days. Call Office For Appointment.  6.	Staples to be removed by RN at rehab Post-Op Day 14 on in office (1/8), provided wound is healed, no open areas drainage.  7.	Ice the leg as much as possible

## 2019-12-25 NOTE — DISCHARGE NOTE PROVIDER - NSDCCPTREATMENT_GEN_ALL_CORE_FT
PRINCIPAL PROCEDURE  Procedure: Intramedullary rodding for fracture of tibial shaft  Findings and Treatment:

## 2019-12-25 NOTE — PROGRESS NOTE ADULT - SUBJECTIVE AND OBJECTIVE BOX
HPI: 24 year old female patient with PMH of depression and polysubstance abuse(heroin and xanax) presented to the ED after a witnessed seizure. Patient currently post ictal and unable to provide pertinent narrative. History provided by sister and friend. According to family, pt left a local store when she suddenly collapsed and started seizing. It was noted that her left lower extremity was deformed after falling. Incident took place around 1800 hours on 12/23/19. Family endorsed almost daily xanax abuse with occasional heroin and alcohol. Patient last used xanax one day prior to arrival in the ED, along with alcohol. It is unclear when pt last used heroin. Patient last had a seizure one month prior, which was related to xanax withdrawal. All of pt's seizures in the past have been connected to abstaining from xanax. Patient in outpatient therapy at [facility] in [place] but has not been in for any sessions lately.     In the ED patient had two witnessed seizures and was found to have a spirl tib/fib fracture of the left lower extremity.    12/24: pt sleepy but responds to questions  EEG negative for seizures  CPK 2131  iv pain meds started    12/25: no more seizures  keppra restarted as per neuro  s/p ortho Sx  k 3.2; replaced, 3.8      PHYSICAL EXAM:    Vital Signs Last 24 Hrs  T(C): 36.6 (25 Dec 2019 12:45), Max: 37.7 (24 Dec 2019 16:00)  T(F): 97.8 (25 Dec 2019 12:45), Max: 99.9 (24 Dec 2019 16:00)  HR: 78 (25 Dec 2019 12:53) (67 - 85)  BP: 115/75 (25 Dec 2019 12:53) (90/48 - 121/69)  BP(mean): 79 (25 Dec 2019 04:00) (57 - 82)  RR: 16 (25 Dec 2019 12:53) (10 - 25)  SpO2: 100% (25 Dec 2019 12:53) (97% - 100%)      Constitutional: Weak appearing  HEENT: Atraumatic, MAKI, Normal, No congestion  Respiratory: Breath Sounds normal, no rhonchi/wheeze  Cardiovascular: N S1S2;   Gastrointestinal: Abdomen soft, non tender, Bowel Sounds present  Extremities: No edema, peripheral pulses present  Neurological: sleepy  Skin: Non cellulitic, no rash, ulcers  Lymph Nodes: No lymphadenopathy noted  Back: No CVA tenderness   Musculoskeletal: non tender  Breasts: Deferred  Genitourinary: deferred  Rectal: Deferred                          10.5   7.51   )----------(  191       ( 25 Dec 2019 12:44 )               31.6      138    |  106    |  4      ----------------------------<  131        ( 25 Dec 2019 12:44 )  3.8     |  28     |  0.64     Ca    7.9        ( 25 Dec 2019 12:44 )        PT/INR -  12.5 sec / 1.12 ratio   ( 25 Dec 2019 06:23 )       PTT -  34.7 sec   ( 25 Dec 2019 06:23 )  CAPILLARY BLOOD GLUCOSE    CARDIAC MARKERS ( 25 Dec 2019 06:23 )  x     / x     / 1391 U/L / x     / x          MEDICATIONS  (STANDING):  levETIRAcetam  IVPB 500 milliGRAM(s) IV Intermittent every 12 hours  potassium chloride  10 mEq/100 mL IVPB 10 milliEquivalent(s) IV Intermittent every 1 hour  sodium chloride 0.9%. 1000 milliLiter(s) (75 mL/Hr) IV Continuous <Continuous>  sodium chloride 0.9%. 1000 milliLiter(s) (100 mL/Hr) IV Continuous <Continuous>

## 2019-12-25 NOTE — PROGRESS NOTE ADULT - ASSESSMENT
24 year old female patient with hx of poly substance abuse with 3 witnessed seizures; admitted with:       #Benzodiazepine withdrawal Seizure  -pt with reported hx of almost daily xanax abuse  -had 3 witnessed seizures  -Neg EEG  -s/p keppra and ativan in the ED; cont Keppra as per neuro  -will give ativan for more breakthrough seizures  -neuro checks  Neurology consult  appreciated; NO DRIVING  add tylenol, oxycodone, iv dilaudid prn    #Spiral tib/fib fracture  -S/P sX  -no contraindication to surgery  -RCRI class 1 risk  -Pt optimized for surgical intervention    #Hx of polysubstance abuse/ Depression  - psychiatry could not evaluate the pt as she was sleepy and uncooperative  -social work consult    #other issues  -clarify med rec when pt more alert/coherent     # Elevated CPK 2131: cont iv fluids; reheck in am, 1391, recheck again in am    #DVT  -heparin s/q  -scds

## 2019-12-26 LAB
ANION GAP SERPL CALC-SCNC: 6 MMOL/L — SIGNIFICANT CHANGE UP (ref 5–17)
BUN SERPL-MCNC: 5 MG/DL — LOW (ref 7–23)
CALCIUM SERPL-MCNC: 8.7 MG/DL — SIGNIFICANT CHANGE UP (ref 8.5–10.1)
CHLORIDE SERPL-SCNC: 102 MMOL/L — SIGNIFICANT CHANGE UP (ref 96–108)
CK SERPL-CCNC: 1437 U/L — HIGH (ref 26–192)
CO2 SERPL-SCNC: 27 MMOL/L — SIGNIFICANT CHANGE UP (ref 22–31)
CREAT SERPL-MCNC: 0.56 MG/DL — SIGNIFICANT CHANGE UP (ref 0.5–1.3)
GLUCOSE SERPL-MCNC: 110 MG/DL — HIGH (ref 70–99)
HCT VFR BLD CALC: 30.8 % — LOW (ref 34.5–45)
HGB BLD-MCNC: 10.5 G/DL — LOW (ref 11.5–15.5)
MAGNESIUM SERPL-MCNC: 1.8 MG/DL — SIGNIFICANT CHANGE UP (ref 1.6–2.6)
MCHC RBC-ENTMCNC: 30.4 PG — SIGNIFICANT CHANGE UP (ref 27–34)
MCHC RBC-ENTMCNC: 34.1 GM/DL — SIGNIFICANT CHANGE UP (ref 32–36)
MCV RBC AUTO: 89.3 FL — SIGNIFICANT CHANGE UP (ref 80–100)
PHOSPHATE SERPL-MCNC: 3.3 MG/DL — SIGNIFICANT CHANGE UP (ref 2.5–4.5)
PLATELET # BLD AUTO: 211 K/UL — SIGNIFICANT CHANGE UP (ref 150–400)
POTASSIUM SERPL-MCNC: 3.7 MMOL/L — SIGNIFICANT CHANGE UP (ref 3.5–5.3)
POTASSIUM SERPL-SCNC: 3.7 MMOL/L — SIGNIFICANT CHANGE UP (ref 3.5–5.3)
RBC # BLD: 3.45 M/UL — LOW (ref 3.8–5.2)
RBC # FLD: 14.3 % — SIGNIFICANT CHANGE UP (ref 10.3–14.5)
SODIUM SERPL-SCNC: 135 MMOL/L — SIGNIFICANT CHANGE UP (ref 135–145)
WBC # BLD: 8.19 K/UL — SIGNIFICANT CHANGE UP (ref 3.8–10.5)
WBC # FLD AUTO: 8.19 K/UL — SIGNIFICANT CHANGE UP (ref 3.8–10.5)

## 2019-12-26 PROCEDURE — 99221 1ST HOSP IP/OBS SF/LOW 40: CPT

## 2019-12-26 PROCEDURE — 99233 SBSQ HOSP IP/OBS HIGH 50: CPT

## 2019-12-26 RX ORDER — HYDROMORPHONE HYDROCHLORIDE 2 MG/ML
1 INJECTION INTRAMUSCULAR; INTRAVENOUS; SUBCUTANEOUS EVERY 4 HOURS
Refills: 0 | Status: DISCONTINUED | OUTPATIENT
Start: 2019-12-26 | End: 2019-12-27

## 2019-12-26 RX ORDER — LEVETIRACETAM 250 MG/1
500 TABLET, FILM COATED ORAL
Refills: 0 | Status: DISCONTINUED | OUTPATIENT
Start: 2019-12-26 | End: 2019-12-28

## 2019-12-26 RX ADMIN — LEVETIRACETAM 400 MILLIGRAM(S): 250 TABLET, FILM COATED ORAL at 17:29

## 2019-12-26 RX ADMIN — Medication 100 MILLIGRAM(S): at 02:05

## 2019-12-26 RX ADMIN — LEVETIRACETAM 400 MILLIGRAM(S): 250 TABLET, FILM COATED ORAL at 05:30

## 2019-12-26 RX ADMIN — OXYCODONE HYDROCHLORIDE 10 MILLIGRAM(S): 5 TABLET ORAL at 12:12

## 2019-12-26 RX ADMIN — HYDROMORPHONE HYDROCHLORIDE 1 MILLIGRAM(S): 2 INJECTION INTRAMUSCULAR; INTRAVENOUS; SUBCUTANEOUS at 20:30

## 2019-12-26 RX ADMIN — Medication 650 MILLIGRAM(S): at 04:11

## 2019-12-26 RX ADMIN — HYDROMORPHONE HYDROCHLORIDE 1 MILLIGRAM(S): 2 INJECTION INTRAMUSCULAR; INTRAVENOUS; SUBCUTANEOUS at 15:24

## 2019-12-26 RX ADMIN — PANTOPRAZOLE SODIUM 40 MILLIGRAM(S): 20 TABLET, DELAYED RELEASE ORAL at 05:31

## 2019-12-26 RX ADMIN — HYDROMORPHONE HYDROCHLORIDE 1 MILLIGRAM(S): 2 INJECTION INTRAMUSCULAR; INTRAVENOUS; SUBCUTANEOUS at 20:13

## 2019-12-26 RX ADMIN — OXYCODONE HYDROCHLORIDE 10 MILLIGRAM(S): 5 TABLET ORAL at 23:08

## 2019-12-26 RX ADMIN — OXYCODONE HYDROCHLORIDE 10 MILLIGRAM(S): 5 TABLET ORAL at 22:38

## 2019-12-26 RX ADMIN — HYDROMORPHONE HYDROCHLORIDE 1 MILLIGRAM(S): 2 INJECTION INTRAMUSCULAR; INTRAVENOUS; SUBCUTANEOUS at 02:32

## 2019-12-26 RX ADMIN — HYDROMORPHONE HYDROCHLORIDE 1 MILLIGRAM(S): 2 INJECTION INTRAMUSCULAR; INTRAVENOUS; SUBCUTANEOUS at 02:04

## 2019-12-26 RX ADMIN — ENOXAPARIN SODIUM 40 MILLIGRAM(S): 100 INJECTION SUBCUTANEOUS at 12:12

## 2019-12-26 RX ADMIN — Medication 650 MILLIGRAM(S): at 00:23

## 2019-12-26 RX ADMIN — HYDROMORPHONE HYDROCHLORIDE 1 MILLIGRAM(S): 2 INJECTION INTRAMUSCULAR; INTRAVENOUS; SUBCUTANEOUS at 09:01

## 2019-12-26 RX ADMIN — OXYCODONE HYDROCHLORIDE 10 MILLIGRAM(S): 5 TABLET ORAL at 18:51

## 2019-12-26 RX ADMIN — Medication 650 MILLIGRAM(S): at 04:05

## 2019-12-26 NOTE — BEHAVIORAL HEALTH ASSESSMENT NOTE - HPI (INCLUDE ILLNESS QUALITY, SEVERITY, DURATION, TIMING, CONTEXT, MODIFYING FACTORS, ASSOCIATED SIGNS AND SYMPTOMS)
Patient is a 24-year-old  female, with history of depression and anxiety and polysubstance abuse, with prior substance-abuse rehab, was admitted for broken leg following seizure. Patient was consult it for benzodiazepine abuse and withdrawal.      Patient reports chronic Xanax abuse for years, using about 2 mg per day. Reports last year‘s being four days ago prior to admission to hospital. Reports her when abuse as well I watch the last time was about a month ago. Reports last substance-abuse rehab was about a month ago. Reports to have been on Effexor, gabapentin, and Zyprexa.    Patient reports depressed mood being a six out of 10 with no acute changes. Reports low motivation and low energy. Denies hopelessness of suicidal thoughts at this time. Reports mild anxiety. Denies panic. Denies manic or psychotic symptoms. Reports needing follow up to [facility] for dual diagnosis treatment. Reports not taking her psychiatric medications for about a week, secondary to not following up with a patient treatment. Patient unable to verify where she fills her medications so they can be confirmed with her pharmacy.     Mother provide collateral, stating patient has history of bipolar, was recently in rehab at [facility]. Denies prior suicide attempts however states patient has history of suicidal thoughts. Reports patient was diagnosed with borderline personality disorder. Reports patient has chronic Xanax abuse. Tonight safety concerns for patient.

## 2019-12-26 NOTE — PHYSICAL THERAPY INITIAL EVALUATION ADULT - GENERAL OBSERVATIONS, REHAB EVAL
resting supine in bed with LLE externally rotated ,resting toward lateral surface ,leg is ACE wrapped with compression dressing,foot is mildly edemetous

## 2019-12-26 NOTE — PROGRESS NOTE ADULT - SUBJECTIVE AND OBJECTIVE BOX
Patient seen and examined at bedside, resting comfortably. Patient's with pain, but pain controlled.  Denies any numbness/tingling. Patient has been ambulating to bathroom    Vital Signs Last 24 Hrs  T(C): 37.3 (26 Dec 2019 04:12), Max: 38.3 (25 Dec 2019 23:44)  T(F): 99.1 (26 Dec 2019 04:12), Max: 101 (25 Dec 2019 23:44)  HR: 84 (26 Dec 2019 06:00) (70 - 112)  BP: 115/64 (26 Dec 2019 06:00) (87/38 - 134/93)  BP(mean): 75 (26 Dec 2019 06:00) (45 - 94)  RR: 17 (26 Dec 2019 04:00) (10 - 23)  SpO2: 98% (26 Dec 2019 00:00) (96% - 100%)        PHYSICAL EXAM  GEN: NAD  LLE:  Skin: Dressing clean/dry/intact  Compartments soft and compressible  Calves minimally tender near fracture site  +TA/EHL/FHL/GSC  Nonpainful passive stretch of great toe  L2-S1 SILT  + DP pulses      Assessment:  24y Female s/p left tibia IMN POD #1    -Pain control, patient has history of IV drug abuse may require greater pain control  -DVT ppx, anticoag team consulted  -WBAT/OOB with assistance as needed  - Patient needs CAM boot, in process of ordering  -PT/OT  -Ice and elevate  -Encourage incentive spirometry  -DC planning  -Will discuss with attending and will advise if plan changes.

## 2019-12-26 NOTE — CONSULT NOTE ADULT - ASSESSMENT
A: 23 yo female with PMH substance abuse with Xanax and heroin, now adm with seizures due to xanax withdrawal, having fallen and sustaining a left tib/fib spiral fx, now S/P ORIIF.  We are consulted by Dr. West for DVT/PE prophylaxis, risk stratification and Anticoagulation Management      P:  Lovenox 40 mg sq daily while in hospital then when d/c would recommend to transition to  mg po bid, would not recommend sending home on Lovenox due to h/o substance abuse  daily CBC, BMP  BLE venodynes  INC mobility as mara    Thank you for the consult, will follow

## 2019-12-26 NOTE — BEHAVIORAL HEALTH ASSESSMENT NOTE - PROBLEM SELECTOR PLAN 1
1. Restart Effexor 37.5 mg daily  2. HOLD Zyprexa secondary to patient abusing benzodiazepines: concurrent use is contraindicated.  3. Psychiatry to follow

## 2019-12-26 NOTE — BEHAVIORAL HEALTH ASSESSMENT NOTE - NSBHCHARTREVIEWINVESTIGATE_PSY_A_CORE FT
Ventricular Rate 81 BPM    Atrial Rate 81 BPM    P-R Interval 168 ms    QRS Duration 82 ms    Q-T Interval 364 ms    QTC Calculation(Bezet) 422 ms    P Axis 47 degrees    R Axis 46 degrees    T Axis 18 degrees    Diagnosis Line Normal sinus rhythm  Normal ECG  When compared with ECG of 23-DEC-2019 18:51,  No significant change was found  Confirmed by CHIRAG LEUNG, BRY ([id number]) on 12/25/2019 7:46:43 AM

## 2019-12-26 NOTE — BEHAVIORAL HEALTH ASSESSMENT NOTE - NSBHCHARTREVIEWVS_PSY_A_CORE FT
Vital Signs Last 24 Hrs  T(C): 37.9 (26 Dec 2019 17:06), Max: 38.3 (25 Dec 2019 23:44)  T(F): 100.3 (26 Dec 2019 17:06), Max: 101 (25 Dec 2019 23:44)  HR: 91 (26 Dec 2019 13:00) (78 - 112)  BP: 121/58 (26 Dec 2019 17:00) (87/38 - 134/93)  BP(mean): 74 (26 Dec 2019 17:00) (45 - 94)  RR: 17 (26 Dec 2019 04:00) (13 - 23)  SpO2: 98% (26 Dec 2019 00:00) (97% - 98%)

## 2019-12-26 NOTE — PROGRESS NOTE ADULT - SUBJECTIVE AND OBJECTIVE BOX
HPI: 24 year old female patient with PMH of depression and polysubstance abuse(heroin and xanax) presented to the ED after a witnessed seizure. Patient currently post ictal and unable to provide pertinent narrative. History provided by sister and friend. According to family, pt left a local store when she suddenly collapsed and started seizing. It was noted that her left lower extremity was deformed after falling. Incident took place around 1800 hours on 12/23/19. Family endorsed almost daily xanax abuse with occasional heroin and alcohol. Patient last used xanax one day prior to arrival in the ED, along with alcohol. It is unclear when pt last used heroin. Patient last had a seizure one month prior, which was related to xanax withdrawal. All of pt's seizures in the past have been connected to abstaining from xanax. Patient in outpatient therapy at [facility] in [place] but has not been in for any sessions lately.     In the ED patient had two witnessed seizures and was found to have a spirl tib/fib fracture of the left lower extremity.    12/24: pt sleepy but responds to questions  EEG negative for seizures  CPK 2131  iv pain meds started    12/25: no more seizures  keppra restarted as per neuro  s/p ortho Sx  k 3.2; replaced, 3.8    12/26: no complaints    PHYSICAL EXAM:        Constitutional: Weak appearing  HEENT: Atraumatic, MAKI, Normal, No congestion  Respiratory: Breath Sounds normal, no rhonchi/wheeze  Cardiovascular: N S1S2;   Gastrointestinal: Abdomen soft, non tender, Bowel Sounds present  Extremities: No edema, peripheral pulses present  Neurological: sleepy  Skin: Non cellulitic, no rash, ulcers  Lymph Nodes: No lymphadenopathy noted  Back: No CVA tenderness   Musculoskeletal: non tender  Breasts: Deferred  Genitourinary: deferred  Rectal: Deferred                          10.5   7.51   )----------(  191       ( 25 Dec 2019 12:44 )               31.6      138    |  106    |  4      ----------------------------<  131        ( 25 Dec 2019 12:44 )  3.8     |  28     |  0.64     Ca    7.9        ( 25 Dec 2019 12:44 )        PT/INR -  12.5 sec / 1.12 ratio   ( 25 Dec 2019 06:23 )       PTT -  34.7 sec   ( 25 Dec 2019 06:23 )  CAPILLARY BLOOD GLUCOSE    CARDIAC MARKERS ( 25 Dec 2019 06:23 )  x     / x     / 1391 U/L / x     / x          MEDICATIONS  (STANDING):  levETIRAcetam  IVPB 500 milliGRAM(s) IV Intermittent every 12 hours  potassium chloride  10 mEq/100 mL IVPB 10 milliEquivalent(s) IV Intermittent every 1 hour  sodium chloride 0.9%. 1000 milliLiter(s) (75 mL/Hr) IV Continuous <Continuous>  sodium chloride 0.9%. 1000 milliLiter(s) (100 mL/Hr) IV Continuous <Continuous>

## 2019-12-26 NOTE — BEHAVIORAL HEALTH ASSESSMENT NOTE - RISK ASSESSMENT
Moderate Acute Suicide Risk LOW RISK    ACUTE RISK FACTORS: depressed mood, broken leg, substance abuse    CHRONIC RISK FACTORS: borderline personality disorder, substance abuse, history of in-patient hospitalization    PROTECTIVE FACTORS: no suicidal ideation/intent/plan, future oriented, supportive family, access to healthcare.      Patient symptoms not indicating imminent risk for harm to self; not warranting involuntary in-patient hospitalization

## 2019-12-26 NOTE — BEHAVIORAL HEALTH ASSESSMENT NOTE - NSBHCHARTREVIEWLAB_PSY_A_CORE FT
10.5   8.19  )-----------( 211      ( 26 Dec 2019 06:53 )             30.8       12-26    135  |  102  |  5<L>  ----------------------------<  110<H>  3.7   |  27  |  0.56    Ca    8.7      26 Dec 2019 06:53  Phos  3.3     12-26  Mg     1.8     12-26                    PT/INR - ( 25 Dec 2019 06:23 )   PT: 12.5 sec;   INR: 1.12 ratio         PTT - ( 25 Dec 2019 06:23 )  PTT:34.7 sec    Lactate Trend

## 2019-12-26 NOTE — PHYSICAL THERAPY INITIAL EVALUATION ADULT - ACTIVE RANGE OF MOTION EXAMINATION, REHAB EVAL
guarded movement LLE due to pain L leg: L ankle DF to neutral, prefers L knee mildly flexed for comfort/RLE Active ROM was WNL (within normal limits)/shun. upper extremity Active ROM was WNL (within normal limits)/deficits as listed below

## 2019-12-26 NOTE — BEHAVIORAL HEALTH ASSESSMENT NOTE - SUMMARY
Patient is a 24-year-old  female, with history of depression and anxiety and polysubstance abuse, with prior substance-abuse rehab, was admitted for broken leg following seizure. Patient was consult it for benzodiazepine abuse and withdrawal.    Patient has mood disorder v substance induced mood disorder. Patient has underlying borderline personality disorder. Patient has no anhedonia, hopelessness or suicidal ideation. Patient has moderate depressed mood. Patient denies current manic / psychotic symptoms. Patient symptoms not indicating imminent risk for harm to self; not warranting involuntary in-patient hospitalization. Patient amendable to restarting psychiatric medications. Patient is a 24-year-old  female, with history of depression and anxiety and polysubstance abuse, with prior substance-abuse rehab, was admitted for broken leg following seizure. Patient was consult it for benzodiazepine abuse and withdrawal.    Patient has mood disorder v substance induced mood disorder. Patient has underlying borderline personality disorder. Patient has no anhedonia, hopelessness or suicidal ideation. Patient has moderate depressed mood. Patient denies current manic / psychotic symptoms. Patient symptoms not indicating imminent risk for harm to self; not warranting involuntary in-patient hospitalization. Patient amendable to restarting psychiatric medications.    PLAN:    1. Restart Effexor 37.5 mg daily  2. HOLD Zyprexa secondary to patient abusing benzodiazepines: concurrent use is contraindicated.  3. Psychiatry to follow

## 2019-12-26 NOTE — CONSULT NOTE ADULT - SUBJECTIVE AND OBJECTIVE BOX
HPI:  24 year old female patient with PMH of depression and polysubstance abuse(heroin and xanax) presented to the ED after a witnessed seizure.  Per ER, According to family, pt left a local store when she suddenly collapsed and started seizing. It was noted that her left lower extremity was deformed after falling. Incident took place around 1800 hours on 19. Family endorsed almost daily xanax abuse with occasional heroin and alcohol. Patient last used xanax one day prior to arrival in the ED, along with alcohol. It is unclear when pt last used heroin. Patient last had a seizure one month prior, which was related to xanax withdrawal. All of pt's seizures in the past have been connected to abstaining from xanax. Patient in outpatient therapy at [facility] in [place] but has not been in for any sessions lately.     In the ED patient had two witnessed seizures and was found to have a spiral tib/fib fracture of the left lower extremity. (24 Dec 2019 00:15)  Now in CICU S/P ORIF of fx        Consulted by Dr. West   for VTE prophylaxis, risk stratification, and anticoagulation management.    PAST MEDICAL & SURGICAL HISTORY:  Polysubstance abuse  Depression  Seizures  No significant past surgical history          CrCl: 131    Caprini VTE Risk Score: CAPRINI SCORE  AGE RELATED RISK FACTORS                                                       MOBILITY RELATED FACTORS  [ ] Age 41-60 years                                            (1 Point)                  [x ] Bed rest/restricted mobility             (1 Point)  [ ] Age: 61-74 years                                           (2 Points)                [ ] Plaster cast                                                   (2 Points)  [ ] Age= 75 years                                              (3 Points)                 [ ] Bed bound for more than 72 hours                   (2 Points)    DISEASE RELATED RISK FACTORS                                               GENDER SPECIFIC FACTORS  [ ] Edema in the lower extremities                       (1 Point)           [ ] Pregnancy                                                            (1 Point)  [ ] Varicose veins                                               (1 Point)                  [ ] Post-partum < 6 weeks                                      (1 Point)             [ ] BMI > 25 Kg/m2                                            (1 Point)                  [ ] Hormonal therapy or oral contraception       (1 Point)                 [ ] Sepsis (in the previous month)                        (1 Point)             [ ] History of pregnancy complications                (1Point)  [ ] Pneumonia or serious lung disease                                             [ ] Unexplained or recurrent  (>/= 3), premature                                 (In the previous month)                               (1 Point)                birth with toxemia or growth-restricted infant (1 Point)  [ ] Abnormal pulmonary function test            (1 Point)                                   SURGERY RELATED RISK FACTORS  [ ] Acute myocardial infarction                       (1 Point)                  [ ]  Section                                         (1 Point)  [ ] Congestive heart failure (in the previous month) (1 Point)   [ ] Minor surgery   lasting <45 minutes       (1 Point)   [ ] Inflammatory bowel disease                             (1 Point)          [ ] Arthroscopic surgery                                  (2 Points)  [ ] Central venous access                                    (2 Points)            [x ] General surgery lasting >45 minutes      (2 Points)       [ ] Stroke (in the previous month)                  (5 Points)            [ ] Elective major lower extremity arthroplasty (5 Points)                                   [  ] Malignancy (present or past include skin melanoma                                          but exclude  basal skin cell)    (2 points)                                      TRAUMA RELATED RISK FACTORS                HEMATOLOGY RELATED FACTORS                                  [x ] Fracture of the hip, pelvis, or leg                       (5 Points)  [ ] Prior episodes of VTE                                     (3 Points)          [ ] Acute spinal cord injury (in the previous month)  (5 Points)  [ ] Positive family history for VTE                         (3 Points)       [ ] Paralysis (less than 1 month)                          (5 Points)  [ ] Prothrombin 48231 A                                      (3 Points)         [ ] Multiple Trauma (within 1month)                 (5Points)                                                                                                                                                                [ ] Factor V Leiden                                          (3 Points)                                OTHER RISK FACTORS                          [ ] Lupus anticoagulants                                     (3 Points)                       [ ] BMI > 40                          (1 Point)                                                         [ ] Anticardiolipin antibodies                                (3 Points)                   [ ] Smoking                              (1Point)                                                [ ] High homocysteine in the blood                      (3 Points)                [  ] Diabetes requiring insulin (1point)                         [ ] Other congenital or acquired thrombophilia       (3 Points)          [  ] Chemotherapy                   (1 Point)  [ ] Heparin induced thrombocytopenia                  (3 Points)             [  ] Blood Transfusion                (1 point)                                                                                                             Total Score [  8        ]                                                                                                                                                                                                                                                                                                                                                                                                                                           IMPROVE Bleeding Risk Score #0    Falls Risk:   High (x  )  Mod (  )  Low (  )      FAMILY HISTORY:    Denies any personal or familial history of clotting or bleeding disorders.    Allergies    No Known Allergies    Intolerances        REVIEW OF SYSTEMS    (  )Fever	     (  )Constipation	(  )SOB				(  )Headache	(  )Dysuria  (  )Chills	     (  )Melena	(  )Dyspnea present on exertion	                    (  )Dizziness                    (  )Polyuria  (  )Nausea	     (  )Hematochezia	(  )Cough			                    (  )Syncope   	(  )Hematuria  (  )Vomiting    (  )Chest Pain	(  )Wheezing			(  )Weakness  (  )Diarrhea     (  )Palpitations	(  )Anorexia			( x )joint pain    All  other review of systems negative: Yes  Vital Signs Last 24 Hrs  T(C): 37.4 (19 @ 09:44), Max: 38.3 (19 @ 23:44)  T(F): 99.3 (19 @ 09:44), Max: 101 (19 @ 23:44)  HR: 91 (19 @ 10:00) (71 - 112)  BP: 108/65 (19 @ 10:00) (87/38 - 134/93)  BP(mean): 75 (19 @ 10:00) (45 - 94)  RR: 17 (19 @ 04:00) (13 - 23)  SpO2: 98% (19 @ 00:00) (96% - 98%)      PHYSICAL EXAM:    Constitutional: Appears Well    Neurological: A& O x 3, flat affect, nods head appropriately to questions but wouldn't verbalize her answers, only speaking to ask for pain meds  Skin: Warm    Respiratory and Thorax: normal effort; Breath sounds: normal; No rales/wheezing/rhonchi  	  Cardiovascular: S1, S2, regular, NMBR	MP SR    Gastrointestinal: BS + x 4Q, nontender	    Genitourinary:  Bladder nondistended, nontender    Musculoskeletal:   General Right:   no muscle/joint tenderness,   normal tone, no joint swelling,   ROM: full	    General Left:   + muscle/joint tenderness,   normal tone, no joint swelling,   ROM: limited    Left      Lt: ace cast intact, toes warm and mobile; Cap refill good;  Sensation intact    Lower extrems:   Right: no calf tenderness              negative angel's sign               + pedal pulses    Left:   no calf tenderness              negative angel's sign               + pedal pulses                          10.5   8.19  )-----------( 211      ( 26 Dec 2019 06:53 )             30.8           135  |  102  |  5<L>  ----------------------------<  110<H>  3.7   |  27  |  0.56    Ca    8.7      26 Dec 2019 06:53  Phos  3.3     12  Mg     1.8             PT/INR - ( 25 Dec 2019 06:23 )   PT: 12.5 sec;   INR: 1.12 ratio         PTT - ( 25 Dec 2019 06:23 )  PTT:34.7 sec		      CT head:   FINDINGS:    There is no acute intracranial mass-effect, hemorrhage, midline shift, or abnormal extra-axial fluid collection. Gray-white differentiation is maintained.    Ventricles, sulci, and cisterns are normal in size for the patient's age without hydrocephalus. Basal cisterns are patent.     Visualized paranasal sinuses and mastoid air cells are clear. Calvarium is intact.     IMPRESSION:     No acute intracranial bleeding, mass effect, or shift.             		    MEDICATIONS  (STANDING):  enoxaparin Injectable 40 milliGRAM(s) SubCutaneous daily  levETIRAcetam  IVPB 500 milliGRAM(s) IV Intermittent every 12 hours  pantoprazole    Tablet 40 milliGRAM(s) Oral before breakfast  polyethylene glycol 3350 17 Gram(s) Oral daily  sodium chloride 0.9%. 1000 milliLiter(s) IV Continuous <Continuous>          DVT Prophylaxis:  LMWH                   ( x )  Heparin SQ           (  )  Coumadin             (  )  Xarelto                  (  )  Eliquis                   (  )  Venodynes           ( x )  Ambulation          (x  )  UFH                       (  )  Contraindicated  (  )  EC ASPIRIN       (  )

## 2019-12-27 ENCOUNTER — TRANSCRIPTION ENCOUNTER (OUTPATIENT)
Age: 24
End: 2019-12-27

## 2019-12-27 DIAGNOSIS — F32.9 MAJOR DEPRESSIVE DISORDER, SINGLE EPISODE, UNSPECIFIED: ICD-10-CM

## 2019-12-27 LAB
ANION GAP SERPL CALC-SCNC: 6 MMOL/L — SIGNIFICANT CHANGE UP (ref 5–17)
BUN SERPL-MCNC: 7 MG/DL — SIGNIFICANT CHANGE UP (ref 7–23)
CALCIUM SERPL-MCNC: 9.3 MG/DL — SIGNIFICANT CHANGE UP (ref 8.5–10.1)
CHLORIDE SERPL-SCNC: 99 MMOL/L — SIGNIFICANT CHANGE UP (ref 96–108)
CO2 SERPL-SCNC: 28 MMOL/L — SIGNIFICANT CHANGE UP (ref 22–31)
CREAT SERPL-MCNC: 0.59 MG/DL — SIGNIFICANT CHANGE UP (ref 0.5–1.3)
GLUCOSE SERPL-MCNC: 101 MG/DL — HIGH (ref 70–99)
HCT VFR BLD CALC: 30.2 % — LOW (ref 34.5–45)
HGB BLD-MCNC: 10 G/DL — LOW (ref 11.5–15.5)
MCHC RBC-ENTMCNC: 29.7 PG — SIGNIFICANT CHANGE UP (ref 27–34)
MCHC RBC-ENTMCNC: 33.1 GM/DL — SIGNIFICANT CHANGE UP (ref 32–36)
MCV RBC AUTO: 89.6 FL — SIGNIFICANT CHANGE UP (ref 80–100)
PLATELET # BLD AUTO: 228 K/UL — SIGNIFICANT CHANGE UP (ref 150–400)
POTASSIUM SERPL-MCNC: 4.1 MMOL/L — SIGNIFICANT CHANGE UP (ref 3.5–5.3)
POTASSIUM SERPL-SCNC: 4.1 MMOL/L — SIGNIFICANT CHANGE UP (ref 3.5–5.3)
RBC # BLD: 3.37 M/UL — LOW (ref 3.8–5.2)
RBC # FLD: 14.4 % — SIGNIFICANT CHANGE UP (ref 10.3–14.5)
SODIUM SERPL-SCNC: 133 MMOL/L — LOW (ref 135–145)
WBC # BLD: 7.57 K/UL — SIGNIFICANT CHANGE UP (ref 3.8–10.5)
WBC # FLD AUTO: 7.57 K/UL — SIGNIFICANT CHANGE UP (ref 3.8–10.5)

## 2019-12-27 PROCEDURE — 99232 SBSQ HOSP IP/OBS MODERATE 35: CPT

## 2019-12-27 PROCEDURE — 99231 SBSQ HOSP IP/OBS SF/LOW 25: CPT

## 2019-12-27 RX ORDER — VENLAFAXINE HCL 75 MG
37.5 CAPSULE, EXT RELEASE 24 HR ORAL DAILY
Refills: 0 | Status: DISCONTINUED | OUTPATIENT
Start: 2019-12-27 | End: 2019-12-28

## 2019-12-27 RX ORDER — HYDROMORPHONE HYDROCHLORIDE 2 MG/ML
4 INJECTION INTRAMUSCULAR; INTRAVENOUS; SUBCUTANEOUS EVERY 6 HOURS
Refills: 0 | Status: DISCONTINUED | OUTPATIENT
Start: 2019-12-27 | End: 2019-12-28

## 2019-12-27 RX ORDER — HYDROMORPHONE HYDROCHLORIDE 2 MG/ML
2 INJECTION INTRAMUSCULAR; INTRAVENOUS; SUBCUTANEOUS
Refills: 0 | Status: DISCONTINUED | OUTPATIENT
Start: 2019-12-27 | End: 2019-12-28

## 2019-12-27 RX ADMIN — HYDROMORPHONE HYDROCHLORIDE 4 MILLIGRAM(S): 2 INJECTION INTRAMUSCULAR; INTRAVENOUS; SUBCUTANEOUS at 20:51

## 2019-12-27 RX ADMIN — PANTOPRAZOLE SODIUM 40 MILLIGRAM(S): 20 TABLET, DELAYED RELEASE ORAL at 05:38

## 2019-12-27 RX ADMIN — OXYCODONE HYDROCHLORIDE 10 MILLIGRAM(S): 5 TABLET ORAL at 05:37

## 2019-12-27 RX ADMIN — HYDROMORPHONE HYDROCHLORIDE 1 MILLIGRAM(S): 2 INJECTION INTRAMUSCULAR; INTRAVENOUS; SUBCUTANEOUS at 00:16

## 2019-12-27 RX ADMIN — HYDROMORPHONE HYDROCHLORIDE 1 MILLIGRAM(S): 2 INJECTION INTRAMUSCULAR; INTRAVENOUS; SUBCUTANEOUS at 00:31

## 2019-12-27 RX ADMIN — Medication 37.5 MILLIGRAM(S): at 11:47

## 2019-12-27 RX ADMIN — HYDROMORPHONE HYDROCHLORIDE 2 MILLIGRAM(S): 2 INJECTION INTRAMUSCULAR; INTRAVENOUS; SUBCUTANEOUS at 17:34

## 2019-12-27 RX ADMIN — LEVETIRACETAM 500 MILLIGRAM(S): 250 TABLET, FILM COATED ORAL at 17:30

## 2019-12-27 RX ADMIN — OXYCODONE HYDROCHLORIDE 10 MILLIGRAM(S): 5 TABLET ORAL at 15:08

## 2019-12-27 RX ADMIN — OXYCODONE HYDROCHLORIDE 10 MILLIGRAM(S): 5 TABLET ORAL at 10:30

## 2019-12-27 RX ADMIN — HYDROMORPHONE HYDROCHLORIDE 4 MILLIGRAM(S): 2 INJECTION INTRAMUSCULAR; INTRAVENOUS; SUBCUTANEOUS at 21:51

## 2019-12-27 RX ADMIN — LEVETIRACETAM 500 MILLIGRAM(S): 250 TABLET, FILM COATED ORAL at 05:37

## 2019-12-27 RX ADMIN — OXYCODONE HYDROCHLORIDE 10 MILLIGRAM(S): 5 TABLET ORAL at 15:38

## 2019-12-27 RX ADMIN — OXYCODONE HYDROCHLORIDE 10 MILLIGRAM(S): 5 TABLET ORAL at 10:03

## 2019-12-27 RX ADMIN — ENOXAPARIN SODIUM 40 MILLIGRAM(S): 100 INJECTION SUBCUTANEOUS at 11:47

## 2019-12-27 RX ADMIN — HYDROMORPHONE HYDROCHLORIDE 2 MILLIGRAM(S): 2 INJECTION INTRAMUSCULAR; INTRAVENOUS; SUBCUTANEOUS at 18:05

## 2019-12-27 NOTE — PROGRESS NOTE BEHAVIORAL HEALTH - NSBHCHARTREVIEWVS_PSY_A_CORE FT
Vital Signs Last 24 Hrs  T(C): 36.9 (27 Dec 2019 17:11), Max: 37.5 (26 Dec 2019 20:20)  T(F): 98.5 (27 Dec 2019 17:11), Max: 99.5 (26 Dec 2019 20:20)  HR: 96 (27 Dec 2019 17:11) (87 - 96)  BP: 118/73 (27 Dec 2019 17:11) (104/49 - 118/73)  BP(mean): --  RR: 16 (27 Dec 2019 17:11) (16 - 16)  SpO2: 99% (27 Dec 2019 17:11) (99% - 100%)

## 2019-12-27 NOTE — PROGRESS NOTE BEHAVIORAL HEALTH - NSBHCHARTREVIEWLAB_PSY_A_CORE FT
10.0   7.57  )-----------( 228      ( 27 Dec 2019 09:26 )             30.2       12-27    133<L>  |  99  |  7   ----------------------------<  101<H>  4.1   |  28  |  0.59    Ca    9.3      27 Dec 2019 09:26  Phos  3.3     12-26  Mg     1.8     12-26                        Lactate Trend      CARDIAC MARKERS ( 27 Dec 2019 09:26 )  x     / x     / 1094 U/L / x     / x      CARDIAC MARKERS ( 26 Dec 2019 06:53 )  x     / x     / 1437 U/L / x     / x

## 2019-12-27 NOTE — PROGRESS NOTE ADULT - SUBJECTIVE AND OBJECTIVE BOX
Patient seen and examined at bedside, resting comfortably. Patient's pain well controlled. Denies any numbness/tingling.     Vital Signs Last 24 Hrs  T(C): 37.2 (26 Dec 2019 21:42), Max: 37.9 (26 Dec 2019 17:06)  T(F): 98.9 (26 Dec 2019 21:42), Max: 100.3 (26 Dec 2019 17:06)  HR: 87 (26 Dec 2019 21:42) (78 - 94)  BP: 104/49 (26 Dec 2019 21:42) (90/61 - 134/84)  BP(mean): 96 (26 Dec 2019 19:00) (67 - 96)  RR: 16 (26 Dec 2019 21:42) (15 - 16)  SpO2: 100% (26 Dec 2019 21:42) (99% - 100%)        PHYSICAL EXAM  GEN: NAD  LLE:  Skin: Dressing clean/dry/intact  Compartments soft and compressible  +TA/EHL/FHL/GSC  L2-S1 SILT  + DP pulses      Assessment:  24y Female s/p left tibia IMN POD #2    -Analgeisa  -DVT ppx, anticoag team consulted  -WBAT/OOB with assistance as needed  -Cam Boot ordered  -PT/OT  -Ice and elevate  -Encourage incentive spirometry  -DC planning  -Will discuss with attending and will advise if plan changes.

## 2019-12-27 NOTE — DISCHARGE NOTE NURSING/CASE MANAGEMENT/SOCIAL WORK - PATIENT PORTAL LINK FT
You can access the FollowMyHealth Patient Portal offered by [facility] by registering at the following website: [email or web address]. By joining [employer]’s FollowMyHealth portal, you will also be able to view your health information using other applications (apps) compatible with our system.

## 2019-12-27 NOTE — PROGRESS NOTE ADULT - SUBJECTIVE AND OBJECTIVE BOX
HPI:  24 year old female patient with PMH of depression and polysubstance abuse(heroin and xanax) presented to the ED after a witnessed seizure.  Per ER, According to family, pt left a local store when she suddenly collapsed and started seizing. It was noted that her left lower extremity was deformed after falling. Incident took place around 1800 hours on 19. Family endorsed almost daily xanax abuse with occasional heroin and alcohol. Patient last used xanax one day prior to arrival in the ED, along with alcohol. It is unclear when pt last used heroin. Patient last had a seizure one month prior, which was related to xanax withdrawal. All of pt's seizures in the past have been connected to abstaining from xanax. Patient in outpatient therapy at [facility] in [place] but has not been in for any sessions lately.     In the ED patient had two witnessed seizures and was found to have a spiral tib/fib fracture of the left lower extremity. (24 Dec 2019 00:15)  Now in CICU S/P ORIF of fx        Consulted by Dr. West   for VTE prophylaxis, risk stratification, and anticoagulation management.    PAST MEDICAL & SURGICAL HISTORY:  Polysubstance abuse  Depression  Seizures  No significant past surgical history          CrCl: 131    Caprini VTE Risk Score: CAPRINI SCORE  AGE RELATED RISK FACTORS                                                       MOBILITY RELATED FACTORS  [ ] Age 41-60 years                                            (1 Point)                  [x ] Bed rest/restricted mobility             (1 Point)  [ ] Age: 61-74 years                                           (2 Points)                [ ] Plaster cast                                                   (2 Points)  [ ] Age= 75 years                                              (3 Points)                 [ ] Bed bound for more than 72 hours                   (2 Points)    DISEASE RELATED RISK FACTORS                                               GENDER SPECIFIC FACTORS  [ ] Edema in the lower extremities                       (1 Point)           [ ] Pregnancy                                                            (1 Point)  [ ] Varicose veins                                               (1 Point)                  [ ] Post-partum < 6 weeks                                      (1 Point)             [ ] BMI > 25 Kg/m2                                            (1 Point)                  [ ] Hormonal therapy or oral contraception       (1 Point)                 [ ] Sepsis (in the previous month)                        (1 Point)             [ ] History of pregnancy complications                (1Point)  [ ] Pneumonia or serious lung disease                                             [ ] Unexplained or recurrent  (>/= 3), premature                                 (In the previous month)                               (1 Point)                birth with toxemia or growth-restricted infant (1 Point)  [ ] Abnormal pulmonary function test            (1 Point)                                   SURGERY RELATED RISK FACTORS  [ ] Acute myocardial infarction                       (1 Point)                  [ ]  Section                                         (1 Point)  [ ] Congestive heart failure (in the previous month) (1 Point)   [ ] Minor surgery   lasting <45 minutes       (1 Point)   [ ] Inflammatory bowel disease                             (1 Point)          [ ] Arthroscopic surgery                                  (2 Points)  [ ] Central venous access                                    (2 Points)            [x ] General surgery lasting >45 minutes      (2 Points)       [ ] Stroke (in the previous month)                  (5 Points)            [ ] Elective major lower extremity arthroplasty (5 Points)                                   [  ] Malignancy (present or past include skin melanoma                                          but exclude  basal skin cell)    (2 points)                                      TRAUMA RELATED RISK FACTORS                HEMATOLOGY RELATED FACTORS                                  [x ] Fracture of the hip, pelvis, or leg                       (5 Points)  [ ] Prior episodes of VTE                                     (3 Points)          [ ] Acute spinal cord injury (in the previous month)  (5 Points)  [ ] Positive family history for VTE                         (3 Points)       [ ] Paralysis (less than 1 month)                          (5 Points)  [ ] Prothrombin 71567 A                                      (3 Points)         [ ] Multiple Trauma (within 1month)                 (5Points)                                                                                                                                                                [ ] Factor V Leiden                                          (3 Points)                                OTHER RISK FACTORS                          [ ] Lupus anticoagulants                                     (3 Points)                       [ ] BMI > 40                          (1 Point)                                                         [ ] Anticardiolipin antibodies                                (3 Points)                   [ ] Smoking                              (1Point)                                                [ ] High homocysteine in the blood                      (3 Points)                [  ] Diabetes requiring insulin (1point)                         [ ] Other congenital or acquired thrombophilia       (3 Points)          [  ] Chemotherapy                   (1 Point)  [ ] Heparin induced thrombocytopenia                  (3 Points)             [  ] Blood Transfusion                (1 point)                                                                                                             Total Score [  8        ]                                                                                                                                                                                                                                                                                                                                                                                                                                           IMPROVE Bleeding Risk Score #0    Falls Risk:   High (x  )  Mod (  )  Low (  )      FAMILY HISTORY:    Denies any personal or familial history of clotting or bleeding disorders.    Allergies    No Known Allergies    Intolerances        REVIEW OF SYSTEMS    (  )Fever	     (  )Constipation	(  )SOB				(  )Headache	(  )Dysuria  (  )Chills	     (  )Melena	(  )Dyspnea present on exertion	                    (  )Dizziness                    (  )Polyuria  (  )Nausea	     (  )Hematochezia	(  )Cough			                    (  )Syncope   	(  )Hematuria  (  )Vomiting    (  )Chest Pain	(  )Wheezing			(  )Weakness  (  )Diarrhea     (  )Palpitations	(  )Anorexia			( x )joint pain    All  other review of systems negative: Yes      Vital Signs Last 24 Hrs  T(C): 37.2 (19 @ 21:42), Max: 37.9 (19 @ 17:06)  T(F): 98.9 (19 @ 21:42), Max: 100.3 (19 @ 17:06)  HR: 87 (19 @ 21:42) (87 - 89)  BP: 104/49 (19 @ 21:42) (104/49 - 134/84)  BP(mean): 96 (19 @ 19:00) (74 - 96)  RR: 16 (19 @ 21:42) (15 - 16)  SpO2: 100% (19 @ 21:42) (99% - 100%)          PHYSICAL EXAM:    Constitutional: Appears Well    Neurological: A& O x 3, flat affect, nods head appropriately to questions but wouldn't verbalize her answers  Skin: Warm    Respiratory and Thorax: normal effort; Breath sounds: normal; No rales/wheezing/rhonchi  	  Cardiovascular: S1, S2, regular, NMBR	MP SR    Gastrointestinal: BS + x 4Q, nontender	    Genitourinary:  Bladder nondistended, nontender    Musculoskeletal:   General Right:   no muscle/joint tenderness,   normal tone, no joint swelling,   ROM: full	    General Left:   + muscle/joint tenderness,   normal tone, no joint swelling,   ROM: limited    Left      Lt: ace cast intact, toes warm and mobile; Cap refill good;  Sensation intact    Lower extrems:   Right: no calf tenderness              negative angel's sign               + pedal pulses    Left:   no calf tenderness              negative angel's sign               + pedal pulses                            10.0   7.57  )-----------( 228      ( 27 Dec 2019 09:26 )             30.2           133<L>  |  99  |  7   ----------------------------<  101<H>  4.1   |  28  |  0.59    Ca    9.3      27 Dec 2019 09:26  Phos  3.3       Mg     1.8                                               10.5   8.19  )-----------( 211      ( 26 Dec 2019 06:53 )             30.8           135  |  102  |  5<L>  ----------------------------<  110<H>  3.7   |  27  |  0.56    Ca    8.7      26 Dec 2019 06:53  Phos  3.3     12-26  Mg     1.8     12-        PT/INR - ( 25 Dec 2019 06:23 )   PT: 12.5 sec;   INR: 1.12 ratio         PTT - ( 25 Dec 2019 06:23 )  PTT:34.7 sec		      CT head:   FINDINGS:    There is no acute intracranial mass-effect, hemorrhage, midline shift, or abnormal extra-axial fluid collection. Gray-white differentiation is maintained.    Ventricles, sulci, and cisterns are normal in size for the patient's age without hydrocephalus. Basal cisterns are patent.     Visualized paranasal sinuses and mastoid air cells are clear. Calvarium is intact.     IMPRESSION:     No acute intracranial bleeding, mass effect, or shift.       MEDICATIONS  (STANDING):  enoxaparin Injectable 40 milliGRAM(s) SubCutaneous daily  levETIRAcetam 500 milliGRAM(s) Oral two times a day  pantoprazole    Tablet 40 milliGRAM(s) Oral before breakfast  polyethylene glycol 3350 17 Gram(s) Oral daily  sodium chloride 0.9%. 1000 milliLiter(s) IV Continuous <Continuous>  venlafaxine XR. 37.5 milliGRAM(s) Oral daily        DVT Prophylaxis:  LMWH                   ( x )  Heparin SQ           (  )  Coumadin             (  )  Xarelto                  (  )  Eliquis                   (  )  Venodynes           ( x )  Ambulation          (x  )  UFH                       (  )  Contraindicated  (  )  EC ASPIRIN       (  )

## 2019-12-27 NOTE — DISCHARGE NOTE NURSING/CASE MANAGEMENT/SOCIAL WORK - NSDCFUADDAPPT_GEN_ALL_CORE_FT
PLEASE CALL [facility] ON MONDAY, [phone] TO OBTAIN INTAKE APPOINTMENT, REFERRAL SENT ON 12/28

## 2019-12-27 NOTE — PROGRESS NOTE ADULT - SUBJECTIVE AND OBJECTIVE BOX
Fit and delivered Cam boot to left LE, leaving ace wrap in place. I instructed pt on donning and adjustment of boot,and left printed instructions and socks.  [facility]

## 2019-12-27 NOTE — PROGRESS NOTE ADULT - SUBJECTIVE AND OBJECTIVE BOX
HPI: 24 year old female patient with PMH of depression and polysubstance abuse(heroin and xanax) presented to the ED after a witnessed seizure. Patient currently post ictal and unable to provide pertinent narrative. History provided by sister and friend. According to family, pt left a local store when she suddenly collapsed and started seizing. It was noted that her left lower extremity was deformed after falling. Incident took place around 1800 hours on 12/23/19. Family endorsed almost daily xanax abuse with occasional heroin and alcohol. Patient last used xanax one day prior to arrival in the ED, along with alcohol. It is unclear when pt last used heroin. Patient last had a seizure one month prior, which was related to xanax withdrawal. All of pt's seizures in the past have been connected to abstaining from xanax. Patient in outpatient therapy at [facility] in [place] but has not been in for any sessions lately.     In the ED patient had two witnessed seizures and was found to have a spirl tib/fib fracture of the left lower extremity.    12/24: pt sleepy but responds to questions  EEG negative for seizures  CPK 2131  iv pain meds started    12/25: no more seizures  keppra restarted as per neuro  s/p ortho Sx  k 3.2; replaced, 3.8    12/26: no complaints    12/27: low grade temp of 100.3 last evening  Needs Psych clearance for discharge  would change to po Dilaudid   CPK trending down    PHYSICAL EXAM:    Vital Signs Last 24 Hrs  T(C): 37.2 (26 Dec 2019 21:42), Max: 37.9 (26 Dec 2019 17:06)  T(F): 98.9 (26 Dec 2019 21:42), Max: 100.3 (26 Dec 2019 17:06)  HR: 87 (26 Dec 2019 21:42) (87 - 89)  BP: 104/49 (26 Dec 2019 21:42) (104/49 - 134/84)  BP(mean): 96 (26 Dec 2019 19:00) (74 - 96)  RR: 16 (26 Dec 2019 21:42) (15 - 16)  SpO2: 100% (26 Dec 2019 21:42) (99% - 100%)    Constitutional: betterappearing  HEENT: Atraumatic, MAKI, Normal, No congestion  Respiratory: Breath Sounds normal, no rhonchi/wheeze  Cardiovascular: N S1S2;   Gastrointestinal: Abdomen soft, non tender, Bowel Sounds present  Extremities: No edema, peripheral pulses present  Neurological: sleepy  Skin: Non cellulitic, no rash, ulcers  Lymph Nodes: No lymphadenopathy noted  Back: No CVA tenderness   Musculoskeletal: non tender  Breasts: Deferred  Genitourinary: deferred  Rectal: Deferred                        10.0   7.57   )----------(  228       ( 27 Dec 2019 09:26 )               30.2      133    |  99     |  7      ----------------------------<  101        ( 27 Dec 2019 09:26 )  4.1     |  28     |  0.59     Ca    9.3        ( 27 Dec 2019 09:26 )            CAPILLARY BLOOD GLUCOSE    CARDIAC MARKERS ( 27 Dec 2019 09:26 )  x     / x     / 1094 U/L / x     / x            MEDICATIONS  (STANDING):  enoxaparin Injectable 40 milliGRAM(s) SubCutaneous daily  levETIRAcetam 500 milliGRAM(s) Oral two times a day  pantoprazole    Tablet 40 milliGRAM(s) Oral before breakfast  polyethylene glycol 3350 17 Gram(s) Oral daily  sodium chloride 0.9%. 1000 milliLiter(s) (100 mL/Hr) IV Continuous <Continuous>  venlafaxine XR. 37.5 milliGRAM(s) Oral daily

## 2019-12-27 NOTE — PROGRESS NOTE BEHAVIORAL HEALTH - NSBHFUPINTERVALHXFT_PSY_A_CORE
Patient presenting calm and corporative, sitting in chair, complains of moderate depressed mood and the setting off recent broken leg and how that affects her life. Reports low energy, low motivation. Patient denies hopelessness or suicidal thoughts. Denies anxiety at this time. Denies medical psychotic symptoms. Patient does not appear delusional. [facility]

## 2019-12-27 NOTE — PROGRESS NOTE ADULT - ASSESSMENT
24 year old female patient with hx of poly substance abuse with 3 witnessed seizures; admitted with:       #Benzodiazepine withdrawal Seizure  -pt with reported hx of almost daily xanax abuse  -had 3 witnessed seizures  -Neg EEG  -s/p keppra and ativan in the ED; cont Keppra bid as per neuro  -will give ativan for more breakthrough seizures  Neurology consult  appreciated; NO DRIVING  added tylenol, oxycodone, iv dilaudid prn    #Spiral tib/fib fracture  -S/P sX  -no contraindication to surgery  -RCRI class 1 risk  -Pt optimized for surgical intervention  change to po dilaudid    #Hx of polysubstance abuse/ Depression  psych consult appreciated; would need Psych clearance prior to discharge    # Elevated CPK 2131: cont iv fluids; trending down    # Low grade temp of 100.3: afebrile today, monitor    #DVT  Lovenox  to be changed to asa 325 mg po bid upon discharge  -scds    DISPO: change to po Dilaudid ; monitor for any fevers; Needs Psych clearance for discharge

## 2019-12-27 NOTE — PROGRESS NOTE ADULT - ASSESSMENT
A: 23 yo female with PMH substance abuse with Xanax and heroin, now adm with seizures due to xanax withdrawal, having fallen and sustaining a left tib/fib spiral fx, now S/P ORIIF.  We are consulted by Dr. West for DVT/PE prophylaxis, risk stratification and Anticoagulation Management      P:  Lovenox 40 mg sq daily while in hospital then when d/c would recommend to transition to  mg po bid, would not recommend sending home on Lovenox due to h/o substance abuse  daily CBC, BMP  BLE venodynes  INC mobility as mara

## 2019-12-28 VITALS
RESPIRATION RATE: 19 BRPM | OXYGEN SATURATION: 99 % | HEART RATE: 102 BPM | SYSTOLIC BLOOD PRESSURE: 131 MMHG | TEMPERATURE: 98 F | DIASTOLIC BLOOD PRESSURE: 72 MMHG

## 2019-12-28 PROCEDURE — 99232 SBSQ HOSP IP/OBS MODERATE 35: CPT

## 2019-12-28 RX ORDER — ASPIRIN/CALCIUM CARB/MAGNESIUM 324 MG
325 TABLET ORAL
Refills: 0 | Status: DISCONTINUED | OUTPATIENT
Start: 2019-12-28 | End: 2019-12-28

## 2019-12-28 RX ORDER — VENLAFAXINE HCL 75 MG
1 CAPSULE, EXT RELEASE 24 HR ORAL
Qty: 30 | Refills: 0
Start: 2019-12-28 | End: 2020-01-26

## 2019-12-28 RX ORDER — ASPIRIN/CALCIUM CARB/MAGNESIUM 324 MG
1 TABLET ORAL
Qty: 0 | Refills: 0 | DISCHARGE
Start: 2019-12-28 | End: 2020-01-25

## 2019-12-28 RX ORDER — ASPIRIN/CALCIUM CARB/MAGNESIUM 324 MG
1 TABLET ORAL
Qty: 60 | Refills: 0
Start: 2019-12-28 | End: 2020-01-26

## 2019-12-28 RX ORDER — HYDROMORPHONE HYDROCHLORIDE 2 MG/ML
1 INJECTION INTRAMUSCULAR; INTRAVENOUS; SUBCUTANEOUS
Qty: 20 | Refills: 0
Start: 2019-12-28 | End: 2020-01-01

## 2019-12-28 RX ORDER — ACETAMINOPHEN 500 MG
2 TABLET ORAL
Qty: 0 | Refills: 0 | DISCHARGE
Start: 2019-12-28

## 2019-12-28 RX ORDER — LEVETIRACETAM 250 MG/1
1 TABLET, FILM COATED ORAL
Qty: 60 | Refills: 0
Start: 2019-12-28 | End: 2020-01-26

## 2019-12-28 RX ORDER — OXYCODONE HYDROCHLORIDE 5 MG/1
1 TABLET ORAL
Qty: 18 | Refills: 0
Start: 2019-12-28 | End: 2019-12-30

## 2019-12-28 RX ADMIN — HYDROMORPHONE HYDROCHLORIDE 4 MILLIGRAM(S): 2 INJECTION INTRAMUSCULAR; INTRAVENOUS; SUBCUTANEOUS at 10:46

## 2019-12-28 RX ADMIN — LEVETIRACETAM 500 MILLIGRAM(S): 250 TABLET, FILM COATED ORAL at 05:31

## 2019-12-28 RX ADMIN — Medication 37.5 MILLIGRAM(S): at 13:05

## 2019-12-28 RX ADMIN — HYDROMORPHONE HYDROCHLORIDE 4 MILLIGRAM(S): 2 INJECTION INTRAMUSCULAR; INTRAVENOUS; SUBCUTANEOUS at 05:18

## 2019-12-28 RX ADMIN — HYDROMORPHONE HYDROCHLORIDE 4 MILLIGRAM(S): 2 INJECTION INTRAMUSCULAR; INTRAVENOUS; SUBCUTANEOUS at 04:18

## 2019-12-28 RX ADMIN — HYDROMORPHONE HYDROCHLORIDE 4 MILLIGRAM(S): 2 INJECTION INTRAMUSCULAR; INTRAVENOUS; SUBCUTANEOUS at 11:46

## 2019-12-28 RX ADMIN — Medication 325 MILLIGRAM(S): at 13:05

## 2019-12-28 NOTE — PROGRESS NOTE ADULT - REASON FOR ADMISSION
Benzodiazepine withdrawal seizure  spiral tib/fib fracture

## 2019-12-28 NOTE — PROGRESS NOTE BEHAVIORAL HEALTH - NSBHCHARTREVIEWVS_PSY_A_CORE FT
Vital Signs Last 24 Hrs  T(C): 36.7 (28 Dec 2019 10:59), Max: 36.9 (27 Dec 2019 17:11)  T(F): 98 (28 Dec 2019 10:59), Max: 98.5 (27 Dec 2019 17:11)  HR: 102 (28 Dec 2019 10:59) (96 - 102)  BP: 131/72 (28 Dec 2019 10:59) (118/73 - 131/72)  BP(mean): 85 (28 Dec 2019 10:59) (85 - 85)  RR: 19 (28 Dec 2019 10:59) (16 - 19)  SpO2: 99% (28 Dec 2019 10:59) (96% - 99%)

## 2019-12-28 NOTE — PROGRESS NOTE BEHAVIORAL HEALTH - NSBHCONSULTFOLLOWAFTERCARE_PSY_A_CORE FT
Patient to get referral from [facility] to [facility] for psychiatry follow up  Continue with Effexor 37.5 mg

## 2019-12-28 NOTE — PROGRESS NOTE BEHAVIORAL HEALTH - RISK ASSESSMENT
LOW RISK    ACUTE RISK FACTORS: depressed mood, broken leg, substance abuse    CHRONIC RISK FACTORS: borderline personality disorder, substance abuse, history of in-patient hospitalization    PROTECTIVE FACTORS: no suicidal ideation/intent/plan, future oriented, supportive family, access to healthcare.      Patient symptoms not indicating imminent risk for harm to self; not warranting involuntary in-patient hospitalization
LOW RISK    ACUTE RISK FACTORS: depressed mood, broken leg, substance abuse    CHRONIC RISK FACTORS: borderline personality disorder, substance abuse, history of in-patient hospitalization    PROTECTIVE FACTORS: no suicidal ideation/intent/plan, future oriented, supportive family, access to healthcare.      Patient symptoms not indicating imminent risk for harm to self; not warranting involuntary in-patient hospitalization

## 2019-12-28 NOTE — PROGRESS NOTE ADULT - SUBJECTIVE AND OBJECTIVE BOX
Patient seen and examined at bedside, resting comfortably. Patient's pain well controlled. Denies any numbness/tingling.     Vital Signs Last 24 Hrs  T(C): 36.8 (27 Dec 2019 23:50), Max: 36.9 (27 Dec 2019 17:11)  T(F): 98.3 (27 Dec 2019 23:50), Max: 98.5 (27 Dec 2019 17:11)  HR: 96 (27 Dec 2019 23:50) (96 - 96)  BP: 127/84 (27 Dec 2019 23:50) (118/73 - 127/84)  BP(mean): --  RR: 16 (27 Dec 2019 23:50) (16 - 16)  SpO2: 96% (27 Dec 2019 23:50) (96% - 99%)        PHYSICAL EXAM  GEN: NAD  LLE:  Skin: Dressing clean/dry/intact, cam boot in place  Compartments soft and compressible  +TA/EHL/FHL/GSC  L2-S1 SILT  + DP pulses      Assessment:  24y Female s/p left tibia IMN POD #3    -Analgeisa  -DVT ppx, anticoag team consulted  -WBAT/OOB with assistance as needed  -Cam Boot , WBAT  -PT/OT  -Ice and elevate  -Encourage incentive spirometry  -DC planning  -Will discuss with attending and will advise if plan changes.

## 2019-12-28 NOTE — PROGRESS NOTE BEHAVIORAL HEALTH - SUMMARY
Patient presenting with depressive episode, with no hopelessness is so self thoughts, patient has no other psychiatric symptoms. Patient does not warrant inpatient psychiatric admission. Patient is future oriented and wants outpatient follow-up.
Patient is a 24-year-old  female, with history of depression and anxiety and polysubstance abuse, with prior substance-abuse rehab, was admitted for broken leg following seizure. Patient was consult it for benzodiazepine abuse and withdrawal.    Patient presenting with depressive episode, with no hopelessness is so self thoughts, patient has no other psychiatric symptoms. Patient does not warrant inpatient psychiatric admission. Patient is future oriented and wants outpatient follow-up.

## 2019-12-28 NOTE — PROGRESS NOTE BEHAVIORAL HEALTH - PROBLEM SELECTOR PLAN 1
1. Restart Effexor 37.5 mg daily  2. HOLD Zyprexa secondary to patient abusing benzodiazepines: concurrent use is contraindicated.  3. Psychiatry to follow
1. Restart Effexor 37.5 mg daily  2. HOLD Zyprexa secondary to patient abusing benzodiazepines: concurrent use is contraindicated.  3. Psychiatry to follow

## 2019-12-28 NOTE — PROGRESS NOTE BEHAVIORAL HEALTH - NSBHFUPINTERVALHXFT_PSY_A_CORE
Patient presenting calm and cooperative, with less depressed mood, more reactive today, stating getting support from mother and friend who visited. Denies hopelessness. Denies suicidal thoughts. Denies manic with psychotic symptoms.    Patient has improved depressed mood influence by emotional support from family. Patient says no suicidal thoughts. Patient has no psychotic or manic symptoms. Patient has no acute risk for inpatient admission.    Patient is motivated for outpatient psychiatric treatment. Patient is engaged and safety planning. Patient reports wanting to go home and be with friends.

## 2019-12-30 ENCOUNTER — INBOUND DOCUMENT (OUTPATIENT)
Age: 24
End: 2019-12-30

## 2019-12-30 DIAGNOSIS — S82.209A UNSPECIFIED FRACTURE OF SHAFT OF UNSPECIFIED TIBIA, INITIAL ENCOUNTER FOR CLOSED FRACTURE: ICD-10-CM

## 2020-01-06 DIAGNOSIS — F41.9 ANXIETY DISORDER, UNSPECIFIED: ICD-10-CM
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
## 2020-01-06 DIAGNOSIS — S82.242A DISPLACED SPIRAL FRACTURE OF SHAFT OF LEFT TIBIA, INITIAL ENCOUNTER FOR CLOSED FRACTURE: ICD-10-CM
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
## 2020-01-06 DIAGNOSIS — Z91.19 PATIENT'S NONCOMPLIANCE WITH OTHER MEDICAL TREATMENT AND REGIMEN: ICD-10-CM

## 2020-01-06 DIAGNOSIS — Z79.899 OTHER LONG TERM (CURRENT) DRUG THERAPY: ICD-10-CM

## 2020-01-06 DIAGNOSIS — S82.442A DISPLACED SPIRAL FRACTURE OF SHAFT OF LEFT FIBULA, INITIAL ENCOUNTER FOR CLOSED FRACTURE: ICD-10-CM

## 2020-01-06 DIAGNOSIS — F60.3 BORDERLINE PERSONALITY DISORDER: ICD-10-CM

## 2020-01-06 DIAGNOSIS — F32.9 MAJOR DEPRESSIVE DISORDER, SINGLE EPISODE, UNSPECIFIED: ICD-10-CM

## 2020-01-06 DIAGNOSIS — F19.19 OTHER PSYCHOACTIVE SUBSTANCE ABUSE WITH UNSPECIFIED PSYCHOACTIVE SUBSTANCE-INDUCED DISORDER: ICD-10-CM

## 2020-01-06 DIAGNOSIS — R56.9 UNSPECIFIED CONVULSIONS: ICD-10-CM

## 2020-01-06 DIAGNOSIS — F13.239 SEDATIVE, HYPNOTIC OR ANXIOLYTIC DEPENDENCE WITH WITHDRAWAL, UNSPECIFIED: ICD-10-CM

## 2020-01-06 PROBLEM — F19.10 OTHER PSYCHOACTIVE SUBSTANCE ABUSE, UNCOMPLICATED: Chronic | Status: ACTIVE | Noted: 2019-12-24

## 2020-01-06 PROBLEM — S82.209A FRACTURE, TIBIA: Status: ACTIVE | Noted: 2020-01-06

## 2020-01-16 RX ORDER — OXYCODONE AND ACETAMINOPHEN 5; 325 MG/1; MG/1
5-325 TABLET ORAL
Qty: 30 | Refills: 0 | Status: ACTIVE | COMMUNITY
Start: 2020-01-06 | End: 1900-01-01

## 2020-01-17 ENCOUNTER — APPOINTMENT (OUTPATIENT)
Dept: ORTHOPEDIC SURGERY | Facility: CLINIC | Age: 25
End: 2020-01-17
Payer: COMMERCIAL

## 2020-01-17 VITALS
WEIGHT: 120 LBS | HEART RATE: 82 BPM | DIASTOLIC BLOOD PRESSURE: 75 MMHG | HEIGHT: 60 IN | SYSTOLIC BLOOD PRESSURE: 108 MMHG | BODY MASS INDEX: 23.56 KG/M2

## 2020-01-17 DIAGNOSIS — S82.202D UNSPECIFIED FRACTURE OF SHAFT OF LEFT TIBIA, SUBSEQUENT ENCOUNTER FOR CLOSED FRACTURE WITH ROUTINE HEALING: ICD-10-CM

## 2020-01-17 DIAGNOSIS — S82.402D UNSPECIFIED FRACTURE OF SHAFT OF LEFT TIBIA, SUBSEQUENT ENCOUNTER FOR CLOSED FRACTURE WITH ROUTINE HEALING: ICD-10-CM

## 2020-01-17 PROCEDURE — 73590 X-RAY EXAM OF LOWER LEG: CPT | Mod: LT

## 2020-01-17 PROCEDURE — 73562 X-RAY EXAM OF KNEE 3: CPT | Mod: LT

## 2020-01-17 PROCEDURE — 99024 POSTOP FOLLOW-UP VISIT: CPT

## 2020-01-17 RX ORDER — HYDROCODONE BITARTRATE AND ACETAMINOPHEN 5; 325 MG/1; MG/1
5-325 TABLET ORAL
Qty: 30 | Refills: 0 | Status: ACTIVE | COMMUNITY
Start: 2020-01-17 | End: 1900-01-01

## 2020-01-17 NOTE — HISTORY OF PRESENT ILLNESS
[___ Weeks Post Op] : [unfilled] weeks post op [Clean/Dry/Intact] : clean, dry and intact [Healed] : healed [Neuro Intact] : an unremarkable neurological exam [Vascular Intact] : ~T peripheral vascular exam normal [Negative Jeffery's] : maneuvers demonstrated a negative Jeffery's sign [Doing Well] : is doing well [Excellent Pain Control] : has excellent pain control [No Sign of Infection] : is showing no signs of infection [Sutures Removed] : sutures were removed [Staples Removed] : staples were removed [Chills] : no chills [Fever] : no fever [Nausea] : no nausea [Erythema] : not erythematous [Vomiting] : no vomiting [Discharge] : absent of discharge [Swelling] : not swollen [Dehiscence] : not dehisced [de-identified] : s/p Open reduction, cephalomedullary nail to the left tibia.\par DOS: 12/25/2019 [de-identified] : 24 year old female present in the office 3 weeks post op from Open reduction, cephalomedullary nail to the left tibia. The initial injury was caused when she had a seizure after taking Xanax. The patient's pain is noted to be a 10/10. The patient describes her pain as constant, and cramping and stabbing in quality. The pain is improved by medication and is exacerbated by moving. She is currently taking Oxycodone, NSAIDs, and Keppra. The patient is accompanied by her father. The patient presents ambulating with crutches and wearing a tall CAM boot. The patient reports that she smokes a quarter pack of cigarettes per day. No other complaints at this time. [de-identified] : 3V of the left knee and left tib fib were ordered obtained and reviewed by me today, 01/17/2020, revealed: hardware in good position with good alignment [de-identified] : General: Alert and oriented x3. In no acute distress. Pleasant in nature with a normal affect. No apparent respiratory distress. The wound is intact. No evidence of any diastases or dehiscence. No surrounding erythema noted. No fluctuance. The area is warm and well perfused. The patient is able to wiggle their toes. Neurovascularly intact. [de-identified] : Patient is a 24 year old female present in the office today 3 weeks s/p s/p Open reduction, cephalomedullary nail to the left tibia. I encouraged the patient to quit smoking and educated them about the risks that smoking has on bone healing. I recommend the patient undergo a course of physical therapy for the left ankle 2-3 times a week for a total of 6-8 weeks. A prescription was given for the physical therapy today. I advised the patient to begin weight bearing with the CAM boot as tolerated with the assistance of physical therapy. I would like to see the patient back in the office in 4-6 weeks to reassess her condition. I have addressed all the patient's concerns surrounding the pathology of their condition. The patient understood and verbally agreed to the treatment plan. All of their questions were answered and they were satisfied with the visit. The patient should call the office if they have any questions or experience worsening symptoms.

## 2020-01-17 NOTE — ADDENDUM
[FreeTextEntry1] : I, Bradly Johnson, acted solely as a scribe for Dr. Maurice West on this date 01/17/2020. \par \par All medical record entries made by the Scribe were at my, Dr. Maurice West, direction and personally dictated by me on 01/17/2020 . I have reviewed the chart and agree that the record accurately reflects my personal performance of the history, physical exam, assessment and plan. I have also personally directed, reviewed, and agreed with the chart.

## 2020-02-26 NOTE — H&P ADULT - NSHPPOACENTRALVENOUSCATHETER_GEN_ALL_CORE
[Worsening] : worsening [___ yrs] : [unfilled] year(s) ago [5] : a maximum pain level of 5/10 [Walking] : walking [Intermit.] : ~He/She~ states the symptoms seem to be intermittent [Ice] : relieved by ice [Rest] : relieved by rest [de-identified] : Pt returns for follow up for her left knee. Pt is receiving her first  Euflexxa #[id number] injection to the left knee. [de-identified] : stairs ascending no

## 2020-05-29 NOTE — PROGRESS NOTE BEHAVIORAL HEALTH - PERCEPTIONS
----- Message from Kedar Ochoa sent at 5/29/2020  9:06 AM CDT -----  Regarding: Creatinine needed for test   Please see below information on an upcoming appointment that was scheduled today:    Patient: Peggy Jeter   MRN: [id number]   Test: CT Chest   Appointment Date: 6/3/2020    Appointment Time: 10:30AM   Message: Patient will need to have creatinine lab prior to appointment.  Can you place order?      Thank You    Order placed  
No abnormalities
No abnormalities

## 2020-07-03 NOTE — PATIENT PROFILE ADULT - IS PATIENT POST-MENOPAUSAL?
Occupational Therapy  Daily Treatment     Patient Name: Gaurav Lopez  Age:  74 y.o., Sex:  male  Medical Record #: [id number]  Today's Date: 7/3/2020     Precautions  Precautions: Fall Risk, Cervical Collar  , Spinal / Back Precautions   Comments: LLE prosthesis, pacemaker, monitor for orthostatic hypotension, abdominal binder placed on abdomen!    Safety   ADL Safety : Requires Supervision for Safety, Requires Physical Assist for Safety, Impaired  Bathroom Safety: Impaired, Requires Supervision for Safety    Subjective       Objective       07/03/20 0831   Precautions   Precautions Fall Risk;Cervical Collar  ;Spinal / Back Precautions    Comments LLE prosthesis, pacemaker, monitor for orthostatic hypotension, abdominal binder placed on abdomen!   Vitals   O2 Delivery Device None - Room Air   Pain   Intervention Declines   Pain 0 - 10 Group   Therapist Pain Assessment 0   Non Verbal Descriptors   Non Verbal Scale  Calm   Cognition    Level of Consciousness Alert   Sleep/Wake Cycle   Sleep & Rest Awake   Functional Level of Assist   Eating Modified Independent   Eating Description Insert dentures   Grooming Modified Independent;Seated   Grooming Description Increased time;Seated in wheelchair at sink   Bathing Maximal Assist   Bathing Description Grab bar;Hand held shower;Long handled bath tool;Tub bench;Assit with back;Assit wtih lower extremities;Assit with perineal;Increased time;Set-up of equipment;Supervision for safety;Verbal cueing   Upper Body Dressing Moderate Assist   Upper Body Dressing Description Application of orthotic or brace;Assist with pulling shirt over head;Increased time   Lower Body Dressing Maximal Assist   Lower Body Dressing Description Grab bar;Reacher;Sock aid;Assist with threading into pant leg;Cues for spinal precautions;Increased time;Set-up of equipment;Supervision for safety;Verbal cueing  (Mod I to manage prosthesis)   Toileting Maximal Assist   Toileting Description Assist for  hygiene;Grab bar;Increased time;Initial preparation for task;Set-up of equipment;Supervision for safety;Verbal cueing   Toilet Transfers Minimal Assist   Toilet Transfer Description Grab bar;Supervision for safety;Verbal cueing   Tub / Shower Transfers Minimal Assist   Tub Shower Transfer Description Supervision for safety;Verbal cueing;Grab bar;Shower bench   Comprehension Modified Independent   Expression Independent   Problem Solving Minimal Assist   Problem Solving Description Supervision;Verbal cueing;Increased time   Memory Supervision   Memory Description Therapy schedule;Supervision   Interdisciplinary Plan of Care Collaboration   IDT Collaboration with  Nursing;Certified Nursing Assistant   Patient Position at End of Therapy Seated;Chair Alarm On;Self Releasing Lap Belt Applied;Call Light within Reach;Tray Table within Reach;Phone within Reach   Collaboration Comments CLOF   OT Total Time Spent   OT Individual Total Time Spent (Mins) 60   OT Charge Group   OT Self Care / ADL 4     Therapist reviewed changing out pads for cervical collar again - poor carryover of instruction from yesterday.  Pt also has a separate cervical collar for showers.  Max assist to don cervical collar.    Assessment    Pt was alert and cooperative w/ tx.  Tx emphasis on discharge prep/FIM's - refer to notes above.  Additional barriers include impaired standing balance/endurance, decreased functional strength, impaired carryover of instruction.    Strengths: Able to follow instructions, Adequate strength, Alert and oriented, Effective communication skills, Independent prior level of function  Barriers: Decreased endurance, Impaired activity tolerance    Plan    Pt to DC tomorrow w/ appropriate AE/DME in place.    Occupational Therapy Goals     Problem: Dressing     Dates: Start: 06/29/20       Goal: STG-Within one week, patient will dress LB     Dates: Start: 06/29/20       Description: 1) Individualized Goal:  min assist lower body  dressing, using appropriate AE  2) Interventions:  OT Self Care/ADL, OT Therapeutic Activity, and OT Therapeutic Exercise                  Problem: OT Long Term Goals     Dates: Start: 06/19/20       Goal: LTG-By discharge, patient will complete basic self care tasks     Dates: Start: 06/19/20       Description: 1) Individualized Goal:  Mod I for BADL's, Sup/SBA for shower via AE/DME PRN  2) Interventions:  OT Self Care/ADL, OT Neuro Re-Ed/Balance, OT Therapeutic Activity, OT Evaluation, and OT Therapeutic Exercise            Goal: LTG-By discharge, patient will perform bathroom transfers     Dates: Start: 06/19/20                   Problem: Toileting     Dates: Start: 06/19/20       Goal: STG-Within one week, patient will complete toileting tasks     Dates: Start: 06/19/20       Description: 1) Individualized Goal:  Max assist for toileting task via AE/DME PRN  2) Interventions:  OT Self Care/ADL, OT Neuro Re-Ed/Balance, OT Therapeutic Activity, OT Evaluation, and OT Therapeutic Exercise      Note:     Goal Note filed on 06/29/20 1106 by Cindy Hess, OT    Total assist for toiletting tasks                               no

## 2020-08-05 ENCOUNTER — EMERGENCY (EMERGENCY)
Facility: HOSPITAL | Age: 25
LOS: 0 days | Discharge: LEFT BEFORE DISCHARGE | End: 2020-08-05
Attending: STUDENT IN AN ORGANIZED HEALTH CARE EDUCATION/TRAINING PROGRAM
Payer: MEDICAID

## 2020-08-05 VITALS
RESPIRATION RATE: 22 BRPM | TEMPERATURE: 99 F | SYSTOLIC BLOOD PRESSURE: 120 MMHG | DIASTOLIC BLOOD PRESSURE: 89 MMHG | OXYGEN SATURATION: 99 % | HEART RATE: 134 BPM

## 2020-08-05 VITALS
SYSTOLIC BLOOD PRESSURE: 122 MMHG | DIASTOLIC BLOOD PRESSURE: 75 MMHG | OXYGEN SATURATION: 100 % | HEART RATE: 99 BPM | RESPIRATION RATE: 10 BRPM

## 2020-08-05 DIAGNOSIS — T40.1X1A POISONING BY HEROIN, ACCIDENTAL (UNINTENTIONAL), INITIAL ENCOUNTER: ICD-10-CM

## 2020-08-05 DIAGNOSIS — I46.9 CARDIAC ARREST, CAUSE UNSPECIFIED: ICD-10-CM
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
## 2020-08-05 LAB
ANION GAP SERPL CALC-SCNC: 5 MMOL/L — SIGNIFICANT CHANGE UP (ref 5–17)
BUN SERPL-MCNC: 10 MG/DL — SIGNIFICANT CHANGE UP (ref 7–23)
CALCIUM SERPL-MCNC: 9.6 MG/DL — SIGNIFICANT CHANGE UP (ref 8.5–10.1)
CHLORIDE SERPL-SCNC: 92 MMOL/L — LOW (ref 96–108)
CO2 SERPL-SCNC: 33 MMOL/L — HIGH (ref 22–31)
CREAT SERPL-MCNC: 0.94 MG/DL — SIGNIFICANT CHANGE UP (ref 0.5–1.3)
GLUCOSE SERPL-MCNC: 170 MG/DL — HIGH (ref 70–99)
HCG SERPL-ACNC: <1 MIU/ML — SIGNIFICANT CHANGE UP
HCT VFR BLD CALC: 38.5 % — SIGNIFICANT CHANGE UP (ref 34.5–45)
HGB BLD-MCNC: 13.2 G/DL — SIGNIFICANT CHANGE UP (ref 11.5–15.5)
MCHC RBC-ENTMCNC: 30.4 PG — SIGNIFICANT CHANGE UP (ref 27–34)
MCHC RBC-ENTMCNC: 34.3 GM/DL — SIGNIFICANT CHANGE UP (ref 32–36)
MCV RBC AUTO: 88.7 FL — SIGNIFICANT CHANGE UP (ref 80–100)
NRBC # BLD: 0 /100 WBCS — SIGNIFICANT CHANGE UP (ref 0–0)
PLATELET # BLD AUTO: 417 K/UL — HIGH (ref 150–400)
POTASSIUM SERPL-MCNC: 4.1 MMOL/L — SIGNIFICANT CHANGE UP (ref 3.5–5.3)
POTASSIUM SERPL-SCNC: 4.1 MMOL/L — SIGNIFICANT CHANGE UP (ref 3.5–5.3)
RBC # BLD: 4.34 M/UL — SIGNIFICANT CHANGE UP (ref 3.8–5.2)
RBC # FLD: 11.6 % — SIGNIFICANT CHANGE UP (ref 10.3–14.5)
SODIUM SERPL-SCNC: 130 MMOL/L — LOW (ref 135–145)
WBC # BLD: 10.11 K/UL — SIGNIFICANT CHANGE UP (ref 3.8–10.5)
WBC # FLD AUTO: 10.11 K/UL — SIGNIFICANT CHANGE UP (ref 3.8–10.5)

## 2020-08-05 PROCEDURE — 99284 EMERGENCY DEPT VISIT MOD MDM: CPT

## 2020-08-05 PROCEDURE — 71045 X-RAY EXAM CHEST 1 VIEW: CPT | Mod: 26

## 2020-08-05 RX ORDER — NALOXONE HYDROCHLORIDE 4 MG/.1ML
0.4 SPRAY NASAL ONCE
Refills: 0 | Status: COMPLETED | OUTPATIENT
Start: 2020-08-05 | End: 2020-08-05

## 2020-08-05 RX ORDER — SODIUM CHLORIDE 9 MG/ML
1000 INJECTION INTRAMUSCULAR; INTRAVENOUS; SUBCUTANEOUS ONCE
Refills: 0 | Status: COMPLETED | OUTPATIENT
Start: 2020-08-05 | End: 2020-08-05

## 2020-08-05 RX ADMIN — SODIUM CHLORIDE 1000 MILLILITER(S): 9 INJECTION INTRAMUSCULAR; INTRAVENOUS; SUBCUTANEOUS at 16:05

## 2020-08-05 RX ADMIN — NALOXONE HYDROCHLORIDE 0.4 MILLIGRAM(S): 4 SPRAY NASAL at 16:00

## 2020-08-05 RX ADMIN — NALOXONE HYDROCHLORIDE 0.4 MILLIGRAM(S): 4 SPRAY NASAL at 16:03

## 2020-08-05 NOTE — ED ADULT NURSE NOTE - NS ED NURSE ELOPE COMMENTS
see reassessment note, iV removed, pt a&O x4 steady gait, safe to leave ER eduction provided verbally.

## 2020-08-05 NOTE — ED ADULT NURSE NOTE - NSIMPLEMENTINTERV_GEN_ALL_ED
Implemented All Fall Risk Interventions:  [place] to call system. Call bell, personal items and telephone within reach. Instruct patient to call for assistance. Room bathroom lighting operational. Non-slip footwear when patient is off stretcher. Physically safe environment: no spills, clutter or unnecessary equipment. Stretcher in lowest position, wheels locked, appropriate side rails in place. Provide visual cue, wrist band, yellow gown, etc. Monitor gait and stability. Monitor for mental status changes and reorient to person, place, and time. Review medications for side effects contributing to fall risk. Reinforce activity limits and safety measures with patient and family.

## 2020-08-05 NOTE — ED ADULT NURSE REASSESSMENT NOTE - NS ED NURSE REASSESS COMMENT FT1
pt left ER stating feel okay, a&O x4, ambulatory, steady gait, respiratory rate within normal limits, color good, denies chest pain shortness of breath. pt educated on risks of drug use. signs and symptoms of withdrawal and overdose discussed. IV previously removed. PT witnessed leaving ER. md aware and OK

## 2020-08-05 NOTE — ED ADULT NURSE REASSESSMENT NOTE - NS ED NURSE REASSESS COMMENT FT1
pt sister at bedside, pt requesting to leave, angered, stating will walk out, wants ot leave. md made aware. IV removed. md aware

## 2020-08-05 NOTE — ED ADULT NURSE NOTE - OBJECTIVE STATEMENT
pT BIB family  , sister driving noticed pt lips were pale then pulled over to provide mouth to mouth. as per sitser pt snorted heroin no other drugs, no smoking no alcohol. pt found unresponsive in car warm to touch pulses present, blue pale shallow breathing. Rapid response to bed 4 , pt IV acces established 04 IM narcan given. 0.4 IV Narcan given as per <MD joya at bedside. pt had immediate response, awake, gasping for air good color. tachycardia monitor, pt breathing on own. Pt denies pain , palpitations or shortness of breath.

## 2020-08-06 LAB — GLUCOSE BLDC GLUCOMTR-MCNC: 157 MG/DL — HIGH (ref 70–99)

## 2020-08-07 NOTE — ED PROVIDER NOTE - OBJECTIVE STATEMENT
25F found blue and nonbreathing by her sister. they had snorted heroin and the sister went into a [employer] [place]. by the time she returned ~5 minuites later the patient wasn't breathing.

## 2020-08-07 NOTE — ED PROVIDER NOTE - CLINICAL SUMMARY MEDICAL DECISION MAKING FREE TEXT BOX
chest pain
patient bagged by BVM on arrival to ED. IM and then IV Narcan (both 0.4mg) administered with sudden return to consciousness. patient encouraged to stay for monitoring and workup due to the possibility of reoccurence of heroin toxicity and pulmonary edema. patient elopes from the ED

## 2020-12-15 PROBLEM — Z01.419 ENCOUNTER FOR GYNECOLOGICAL EXAMINATION: Status: RESOLVED | Noted: 2017-03-22 | Resolved: 2020-12-15

## 2021-06-03 NOTE — ED BEHAVIORAL HEALTH ASSESSMENT NOTE - DESCRIPTION
Patient was tearful, crouched into a ball, with poor eye contact. She denies any physical complaints ,and appeared forthcoming. Vital Signs Last 24 Hrs  T(C): 37 (04 Sep 2018 16:07), Max: 37 (04 Sep 2018 16:07)  T(F): 98.6 (04 Sep 2018 16:07), Max: 98.6 (04 Sep 2018 16:07)  HR: 70 (04 Sep 2018 16:07) (70 - 70)  BP: 109/72 (04 Sep 2018 16:07) (109/72 - 109/72)  BP(mean): --  RR: 18 (04 Sep 2018 16:07) (18 - 18)  SpO2: 98% (04 Sep 2018 16:07) (98% - 98%) Samples Given: Aklief cream \\nOracea 40mg Render In Strict Bullet Format?: No Denies Detail Level: Zone Raised by mother, has 10 y/o step brother and 22 y/o sister.  Completed a GED, with cosmotology degree, currently employed

## 2022-07-13 NOTE — PROGRESS NOTE BEHAVIORAL HEALTH - NS ED BHA MED ROS SKIN
How Severe Is Your Psoriasis?: mild Do You Have A Family History Of Psoriasis?: no Is This A New Presentation, Or A Follow-Up?: Psoriasis No complaints

## 2023-03-25 NOTE — ED PROVIDER NOTE - PSH
Patient seen for wound care f/u.  Dressing change provided to LLE x2.  Pt tolerated well with use of oral premedication and topical lidocaine. Discussed with pt/RN, no new skin related concerns at this time.  PUP standards in place and reinforced; immersion mattress, wedge cushions and TRuVue heel protectors in use. Wound care to f/u tomorrow.  25 minutes direct pt care and documentation.   1130 received phone call from unit nurse that dressing to leg has become soiled with urine; she will remove outer dressing and writer will return to unit to provide additional dressing change per flowsheet; additional 25 minutes facetime and documentation.    No significant past surgical history

## 2023-09-05 NOTE — BRIEF OPERATIVE NOTE - SPECIMENS
SUBJECTIVE  Moraima Young is a       36 year old female with a GA of 28w6d and MEL of 2023. Feeling well.  States taking her bs at home, and for the most part are normal, highest fasting has been 114, highest 2 hour pp 141.     Denies vaginal bleeding and leaking of fluid. good fetal movement    OBJECTIVE: See Flowsheet.    ASSESSMENT  Diagnoses and all orders for this visit:  Multigravida of advanced maternal age in third trimester  -     POCT Urine Dip Auto  -     TETANUS DIPHTHERIA ACELLULAR PERTUSSIS VACC, 11+ YRS (ADACEL)  -     US OB FOLLOW UP SINGLE FETUS; Future  Diet controlled gestational diabetes mellitus (GDM) in third trimester  -     US OB FOLLOW UP SINGLE FETUS; Future      PLAN  PN labs: abnormal 1 hour gtt, declined 3 hour (known gdm previous pregnancy), is checking bs at home per her, advised to print out log from glucometer and email to us thru portal, as above, fastings mostly normal per patient but highest 114, 2 hour pp highest 141, states she \"knows she can do better, add walks in and control carbs\" and plans to this next week, reviewed with patient may need glucose management with insulin, states she will send us her numbers  -doing hba1c and 3tl labs today as was advised by Dr. Alvarez  Size > dates, check growth us    Depression/anxiety: edps 14, working with , saw them recently and patient states she is doing well, on zoloft    RH +, tdap today  F/U 2 weeks  Laina Parham MD      
none

## 2023-11-13 ENCOUNTER — EMERGENCY (EMERGENCY)
Facility: HOSPITAL | Age: 28
LOS: 1 days | Discharge: HOME | End: 2023-11-13
Attending: EMERGENCY MEDICINE | Admitting: EMERGENCY MEDICINE
Payer: MEDICAID

## 2023-11-13 VITALS
HEART RATE: 88 BPM | WEIGHT: 134.92 LBS | SYSTOLIC BLOOD PRESSURE: 108 MMHG | TEMPERATURE: 99 F | OXYGEN SATURATION: 95 % | RESPIRATION RATE: 16 BRPM | DIASTOLIC BLOOD PRESSURE: 72 MMHG

## 2023-11-13 LAB
ALBUMIN SERPL ELPH-MCNC: 3.4 G/DL — SIGNIFICANT CHANGE UP (ref 3.3–5)
ALBUMIN SERPL ELPH-MCNC: 3.4 G/DL — SIGNIFICANT CHANGE UP (ref 3.3–5)
ALP SERPL-CCNC: 57 U/L — SIGNIFICANT CHANGE UP (ref 40–120)
ALP SERPL-CCNC: 57 U/L — SIGNIFICANT CHANGE UP (ref 40–120)
ALT FLD-CCNC: 121 U/L — HIGH (ref 12–78)
ALT FLD-CCNC: 121 U/L — HIGH (ref 12–78)
AMPHET UR-MCNC: NEGATIVE — SIGNIFICANT CHANGE UP
AMPHET UR-MCNC: NEGATIVE — SIGNIFICANT CHANGE UP
ANION GAP SERPL CALC-SCNC: 6 MMOL/L — SIGNIFICANT CHANGE UP (ref 5–17)
ANION GAP SERPL CALC-SCNC: 6 MMOL/L — SIGNIFICANT CHANGE UP (ref 5–17)
APAP SERPL-MCNC: <2 UG/ML — LOW (ref 10–30)
APAP SERPL-MCNC: <2 UG/ML — LOW (ref 10–30)
AST SERPL-CCNC: 76 U/L — HIGH (ref 15–37)
AST SERPL-CCNC: 76 U/L — HIGH (ref 15–37)
BARBITURATES UR SCN-MCNC: NEGATIVE — SIGNIFICANT CHANGE UP
BARBITURATES UR SCN-MCNC: NEGATIVE — SIGNIFICANT CHANGE UP
BASOPHILS # BLD AUTO: 0.04 K/UL — SIGNIFICANT CHANGE UP (ref 0–0.2)
BASOPHILS # BLD AUTO: 0.04 K/UL — SIGNIFICANT CHANGE UP (ref 0–0.2)
BASOPHILS NFR BLD AUTO: 0.8 % — SIGNIFICANT CHANGE UP (ref 0–2)
BASOPHILS NFR BLD AUTO: 0.8 % — SIGNIFICANT CHANGE UP (ref 0–2)
BENZODIAZ UR-MCNC: NEGATIVE — SIGNIFICANT CHANGE UP
BENZODIAZ UR-MCNC: NEGATIVE — SIGNIFICANT CHANGE UP
BILIRUB SERPL-MCNC: 0.4 MG/DL — SIGNIFICANT CHANGE UP (ref 0.2–1.2)
BILIRUB SERPL-MCNC: 0.4 MG/DL — SIGNIFICANT CHANGE UP (ref 0.2–1.2)
BUN SERPL-MCNC: 13 MG/DL — SIGNIFICANT CHANGE UP (ref 7–23)
BUN SERPL-MCNC: 13 MG/DL — SIGNIFICANT CHANGE UP (ref 7–23)
CALCIUM SERPL-MCNC: 8.6 MG/DL — SIGNIFICANT CHANGE UP (ref 8.5–10.1)
CALCIUM SERPL-MCNC: 8.6 MG/DL — SIGNIFICANT CHANGE UP (ref 8.5–10.1)
CHLORIDE SERPL-SCNC: 106 MMOL/L — SIGNIFICANT CHANGE UP (ref 96–108)
CHLORIDE SERPL-SCNC: 106 MMOL/L — SIGNIFICANT CHANGE UP (ref 96–108)
CK MB CFR SERPL CALC: <1 NG/ML — SIGNIFICANT CHANGE UP (ref 0–3.6)
CK MB CFR SERPL CALC: <1 NG/ML — SIGNIFICANT CHANGE UP (ref 0–3.6)
CO2 SERPL-SCNC: 27 MMOL/L — SIGNIFICANT CHANGE UP (ref 22–31)
CO2 SERPL-SCNC: 27 MMOL/L — SIGNIFICANT CHANGE UP (ref 22–31)
COCAINE METAB.OTHER UR-MCNC: POSITIVE — SIGNIFICANT CHANGE UP
COCAINE METAB.OTHER UR-MCNC: POSITIVE — SIGNIFICANT CHANGE UP
CREAT SERPL-MCNC: 0.98 MG/DL — SIGNIFICANT CHANGE UP (ref 0.5–1.3)
CREAT SERPL-MCNC: 0.98 MG/DL — SIGNIFICANT CHANGE UP (ref 0.5–1.3)
EGFR: 81 ML/MIN/1.73M2 — SIGNIFICANT CHANGE UP
EGFR: 81 ML/MIN/1.73M2 — SIGNIFICANT CHANGE UP
EOSINOPHIL # BLD AUTO: 0.21 K/UL — SIGNIFICANT CHANGE UP (ref 0–0.5)
EOSINOPHIL # BLD AUTO: 0.21 K/UL — SIGNIFICANT CHANGE UP (ref 0–0.5)
EOSINOPHIL NFR BLD AUTO: 4.4 % — SIGNIFICANT CHANGE UP (ref 0–6)
EOSINOPHIL NFR BLD AUTO: 4.4 % — SIGNIFICANT CHANGE UP (ref 0–6)
ETHANOL SERPL-MCNC: <10 MG/DL — SIGNIFICANT CHANGE UP (ref 0–10)
ETHANOL SERPL-MCNC: <10 MG/DL — SIGNIFICANT CHANGE UP (ref 0–10)
GLUCOSE SERPL-MCNC: 105 MG/DL — HIGH (ref 70–99)
GLUCOSE SERPL-MCNC: 105 MG/DL — HIGH (ref 70–99)
HCG SERPL-ACNC: <1 MIU/ML — SIGNIFICANT CHANGE UP
HCG SERPL-ACNC: <1 MIU/ML — SIGNIFICANT CHANGE UP
HCT VFR BLD CALC: 36.2 % — SIGNIFICANT CHANGE UP (ref 34.5–45)
HCT VFR BLD CALC: 36.2 % — SIGNIFICANT CHANGE UP (ref 34.5–45)
HGB BLD-MCNC: 11.8 G/DL — SIGNIFICANT CHANGE UP (ref 11.5–15.5)
HGB BLD-MCNC: 11.8 G/DL — SIGNIFICANT CHANGE UP (ref 11.5–15.5)
IMM GRANULOCYTES NFR BLD AUTO: 0.2 % — SIGNIFICANT CHANGE UP (ref 0–0.9)
IMM GRANULOCYTES NFR BLD AUTO: 0.2 % — SIGNIFICANT CHANGE UP (ref 0–0.9)
LYMPHOCYTES # BLD AUTO: 1.88 K/UL — SIGNIFICANT CHANGE UP (ref 1–3.3)
LYMPHOCYTES # BLD AUTO: 1.88 K/UL — SIGNIFICANT CHANGE UP (ref 1–3.3)
LYMPHOCYTES # BLD AUTO: 39.7 % — SIGNIFICANT CHANGE UP (ref 13–44)
LYMPHOCYTES # BLD AUTO: 39.7 % — SIGNIFICANT CHANGE UP (ref 13–44)
MCHC RBC-ENTMCNC: 29.6 PG — SIGNIFICANT CHANGE UP (ref 27–34)
MCHC RBC-ENTMCNC: 29.6 PG — SIGNIFICANT CHANGE UP (ref 27–34)
MCHC RBC-ENTMCNC: 32.6 GM/DL — SIGNIFICANT CHANGE UP (ref 32–36)
MCHC RBC-ENTMCNC: 32.6 GM/DL — SIGNIFICANT CHANGE UP (ref 32–36)
MCV RBC AUTO: 91 FL — SIGNIFICANT CHANGE UP (ref 80–100)
MCV RBC AUTO: 91 FL — SIGNIFICANT CHANGE UP (ref 80–100)
METHADONE UR-MCNC: NEGATIVE — SIGNIFICANT CHANGE UP
METHADONE UR-MCNC: NEGATIVE — SIGNIFICANT CHANGE UP
MONOCYTES # BLD AUTO: 0.54 K/UL — SIGNIFICANT CHANGE UP (ref 0–0.9)
MONOCYTES # BLD AUTO: 0.54 K/UL — SIGNIFICANT CHANGE UP (ref 0–0.9)
MONOCYTES NFR BLD AUTO: 11.4 % — SIGNIFICANT CHANGE UP (ref 2–14)
MONOCYTES NFR BLD AUTO: 11.4 % — SIGNIFICANT CHANGE UP (ref 2–14)
NEUTROPHILS # BLD AUTO: 2.06 K/UL — SIGNIFICANT CHANGE UP (ref 1.8–7.4)
NEUTROPHILS # BLD AUTO: 2.06 K/UL — SIGNIFICANT CHANGE UP (ref 1.8–7.4)
NEUTROPHILS NFR BLD AUTO: 43.5 % — SIGNIFICANT CHANGE UP (ref 43–77)
NEUTROPHILS NFR BLD AUTO: 43.5 % — SIGNIFICANT CHANGE UP (ref 43–77)
NRBC # BLD: 0 /100 WBCS — SIGNIFICANT CHANGE UP (ref 0–0)
NRBC # BLD: 0 /100 WBCS — SIGNIFICANT CHANGE UP (ref 0–0)
OPIATES UR-MCNC: NEGATIVE — SIGNIFICANT CHANGE UP
OPIATES UR-MCNC: NEGATIVE — SIGNIFICANT CHANGE UP
PCP SPEC-MCNC: SIGNIFICANT CHANGE UP
PCP SPEC-MCNC: SIGNIFICANT CHANGE UP
PCP UR-MCNC: NEGATIVE — SIGNIFICANT CHANGE UP
PCP UR-MCNC: NEGATIVE — SIGNIFICANT CHANGE UP
PLATELET # BLD AUTO: 242 K/UL — SIGNIFICANT CHANGE UP (ref 150–400)
PLATELET # BLD AUTO: 242 K/UL — SIGNIFICANT CHANGE UP (ref 150–400)
POTASSIUM SERPL-MCNC: 4.5 MMOL/L — SIGNIFICANT CHANGE UP (ref 3.5–5.3)
POTASSIUM SERPL-MCNC: 4.5 MMOL/L — SIGNIFICANT CHANGE UP (ref 3.5–5.3)
POTASSIUM SERPL-SCNC: 4.5 MMOL/L — SIGNIFICANT CHANGE UP (ref 3.5–5.3)
POTASSIUM SERPL-SCNC: 4.5 MMOL/L — SIGNIFICANT CHANGE UP (ref 3.5–5.3)
PROCALCITONIN SERPL-MCNC: 0.03 NG/ML — SIGNIFICANT CHANGE UP
PROCALCITONIN SERPL-MCNC: 0.03 NG/ML — SIGNIFICANT CHANGE UP
PROT SERPL-MCNC: 7.6 G/DL — SIGNIFICANT CHANGE UP (ref 6–8.3)
PROT SERPL-MCNC: 7.6 G/DL — SIGNIFICANT CHANGE UP (ref 6–8.3)
RBC # BLD: 3.98 M/UL — SIGNIFICANT CHANGE UP (ref 3.8–5.2)
RBC # BLD: 3.98 M/UL — SIGNIFICANT CHANGE UP (ref 3.8–5.2)
RBC # FLD: 12.9 % — SIGNIFICANT CHANGE UP (ref 10.3–14.5)
RBC # FLD: 12.9 % — SIGNIFICANT CHANGE UP (ref 10.3–14.5)
SALICYLATES SERPL-MCNC: <1.7 MG/DL — LOW (ref 2.8–20)
SALICYLATES SERPL-MCNC: <1.7 MG/DL — LOW (ref 2.8–20)
SODIUM SERPL-SCNC: 139 MMOL/L — SIGNIFICANT CHANGE UP (ref 135–145)
SODIUM SERPL-SCNC: 139 MMOL/L — SIGNIFICANT CHANGE UP (ref 135–145)
THC UR QL: NEGATIVE — SIGNIFICANT CHANGE UP
THC UR QL: NEGATIVE — SIGNIFICANT CHANGE UP
WBC # BLD: 4.74 K/UL — SIGNIFICANT CHANGE UP (ref 3.8–10.5)
WBC # BLD: 4.74 K/UL — SIGNIFICANT CHANGE UP (ref 3.8–10.5)
WBC # FLD AUTO: 4.74 K/UL — SIGNIFICANT CHANGE UP (ref 3.8–10.5)
WBC # FLD AUTO: 4.74 K/UL — SIGNIFICANT CHANGE UP (ref 3.8–10.5)

## 2023-11-13 PROCEDURE — 84702 CHORIONIC GONADOTROPIN TEST: CPT

## 2023-11-13 PROCEDURE — 99284 EMERGENCY DEPT VISIT MOD MDM: CPT

## 2023-11-13 PROCEDURE — 80307 DRUG TEST PRSMV CHEM ANLYZR: CPT

## 2023-11-13 PROCEDURE — 99283 EMERGENCY DEPT VISIT LOW MDM: CPT

## 2023-11-13 PROCEDURE — 80053 COMPREHEN METABOLIC PANEL: CPT

## 2023-11-13 PROCEDURE — 85025 COMPLETE CBC W/AUTO DIFF WBC: CPT

## 2023-11-13 PROCEDURE — 84145 PROCALCITONIN (PCT): CPT

## 2023-11-13 PROCEDURE — 36415 COLL VENOUS BLD VENIPUNCTURE: CPT

## 2023-11-13 PROCEDURE — 82553 CREATINE MB FRACTION: CPT

## 2023-11-13 RX ORDER — SODIUM CHLORIDE 9 MG/ML
1000 INJECTION INTRAMUSCULAR; INTRAVENOUS; SUBCUTANEOUS ONCE
Refills: 0 | Status: DISCONTINUED | OUTPATIENT
Start: 2023-11-13 | End: 2023-11-17

## 2023-11-13 NOTE — ED ADULT NURSE NOTE - NSFALLRISKINTERV_ED_ALL_ED

## 2023-11-13 NOTE — ED ADULT NURSE NOTE - OBJECTIVE STATEMENT
pt states she had a seizure three days ago. pt states she use to take keppra but does not take it anymore. Pt states she went to [facility] for seizures bur left without being seen on Friday. Pt states that she feel like she is about to have a seizure. Pt appears drowsy and anxious. Pt in no acute distress. Pt updated on plan of care.

## 2023-11-13 NOTE — ED PROVIDER NOTE - PROGRESS NOTE DETAILS
pt walked to desk in er normal gait, demanding her medications including suboxone. she was reminded of the conversation I had with her and then her mom regarding testing, and needing a ua to test for substances.  pt voided in toilet and is refusing to provide additional ua until she is medicated.   buspar was ordered from pharmacy on pt arrival but in conversation with clinical pharmacist dose witheld until testing as this is not a stat med. results reviewed at bedside, dw pt stepdad and by phone with mom  aware of abnl lft and cocaine use pt denies.    will give evening mds mom states she has follow up with out pt psych at [facility] in am  will dc

## 2023-11-13 NOTE — ED PROVIDER NOTE - NSFOLLOWUPCLINICSTOKEN_GEN_ALL_ED_FT
[id number]: || ||00\01||False;[id number]: || ||00\01||False;[id number]: || ||00\01||False;[id number]: || ||00\01||False;[id number]: || ||00\01||False;[id number]: || ||00\01||False; 2

## 2023-11-13 NOTE — ED PROVIDER NOTE - NEUROLOGICAL, MLM
Alert and oriented, no focal deficits, no motor or sensory deficits. pt has no localizing findings speech is clear smooth and she has no cogwheel rigidity no evidence of incontinence, no muscle atrophy.

## 2023-11-13 NOTE — ED PROVIDER NOTE - PATIENT PORTAL LINK FT
You can access the FollowMyHealth Patient Portal offered by [facility] by registering at the following website: [email or web address]. By joining [facility]’s FollowMyHealth portal, you will also be able to view your health information using other applications (apps) compatible with our system.

## 2023-11-13 NOTE — ED PROVIDER NOTE - SKIN, MLM
Skin normal color for race, warm, dry and intact. No evidence of rash. there are multiple scars over the arms hands and face from prior ivda use. no active infection

## 2023-11-13 NOTE — ED PROVIDER NOTE - HEME LYMPH, MLM
Pt called office stating that the new medication that Francia prescribed on 1/26/2023, needs prior authorization. Pt stated [employer] is going to send prior auth to our office but it might take about 14 days. Pt stated she cannot wit that long and would like a call back from clinical team to discuss. Please call [phone].   No adenopathy or splenomegaly. No cervical or inguinal lymphadenopathy.

## 2023-11-13 NOTE — ED PROVIDER NOTE - CLINICAL SUMMARY MEDICAL DECISION MAKING FREE TEXT BOX
Patient is a 28-year-old female with a history of significant polysubstance abuse and psychiatric illness.  She is approximately 6 to 7 months sober, taking numerous medications for anxiety and depression in addition to Buspar and Suboxone.  According to her mother and father with whom she lives patient has been hallucinating and not behaving normally since last week.  Out of an abundance of concern for taking synthetic opioids or other synthetic medications mother did a home drug test which was negative.  But these drug tests are limited.  Patient was taken to [facility] for a possible seizure, where she signed herself out prior to evaluation.  Symptoms have persisted where she has periodic hallucinations, abnormal behavior and tremors so patient was brought to [facility] rule out a metabolic derangement.  There was no visible, witnessed seizure activity.  Patient has no stigmata of having had a seizure.  We will assess procalcitonin and CPK.  We will check sodium and other electrolytes.  Check ammonia level.  CT imaging of the brain.  Urine pregnancy test and urine drug testing.  Provide IV hydration.  Cardiac monitor and EKG.  Seizure precautions.  As the patient's symptoms may be a result of exacerbation of psychiatric illness, metabolic toxic derangement, neurologic or infectious causes.  Disposition accordingly.  This chart was made with dictation software and may contain typographical errors.

## 2023-11-13 NOTE — ED PROVIDER NOTE - CARE PROVIDER_API CALL
Karen Atkinson  Family Medicine  [address], [address]  [place] [address]  Phone: [phone]  Fax: [phone]  Follow Up Time:

## 2023-11-13 NOTE — ED PROVIDER NOTE - NSFOLLOWUPINSTRUCTIONS_ED_ALL_ED_FT
follow up with your doctors for re-evaluation  stay well hydrated  do not use any medication that was not prescribed to you by your doctors  follow up at [facility] tomorrow as arranged by your doctor  follow up with medicine or [facility]  Generalized Anxiety Disorder, Adult  Generalized anxiety disorder (NIKI) is a mental health condition. Unlike normal worries, anxiety related to NIKI is not triggered by a specific event. These worries do not fade or get better with time. NIKI interferes with relationships, work, and school.    NIKI symptoms can vary from mild to severe. People with severe NIKI can have intense waves of anxiety with physical symptoms that are similar to panic attacks.    What are the causes?  The exact cause of NIKI is not known, but the following are believed to have an impact:  Differences in natural brain chemicals.  Genes passed down from parents to children.  Differences in the way threats are perceived.  Development and stress during childhood.  Personality.  What increases the risk?  The following factors may make you more likely to develop this condition:  Being female.  Having a family history of anxiety disorders.  Being very shy.  Experiencing very stressful life events, such as the death of a loved one.  Having a very stressful family environment.  What are the signs or symptoms?  People with NIKI often worry excessively about many things in their lives, such as their health and family. Symptoms may also include:  Mental and emotional symptoms:  Worrying excessively about natural disasters.  Fear of being late.  Difficulty concentrating.  Fears that others are judging your performance.  Physical symptoms:  Fatigue.  Headaches, muscle tension, muscle twitches, trembling, or feeling shaky.  Feeling like your heart is pounding or beating very fast.  Feeling out of breath or like you cannot take a deep breath.  Having trouble falling asleep or staying asleep, or experiencing restlessness.  Sweating.  Nausea, diarrhea, or irritable bowel syndrome (IBS).  Behavioral symptoms:  Experiencing erratic moods or irritability.  Avoidance of new situations.  Avoidance of people.  Extreme difficulty making decisions.  How is this diagnosed?  This condition is diagnosed based on your symptoms and medical history. You will also have a physical exam. Your health care provider may perform tests to rule out other possible causes of your symptoms.    To be diagnosed with NIKI, a person must have anxiety that:  Is out of his or her control.  Affects several different aspects of his or her life, such as work and relationships.  Causes distress that makes him or her unable to take part in normal activities.  Includes at least three symptoms of NIKI, such as restlessness, fatigue, trouble concentrating, irritability, muscle tension, or sleep problems.  Before your health care provider can confirm a diagnosis of NIKI, these symptoms must be present more days than they are not, and they must last for 6 months or longer.    How is this treated?  A person talking with a mental health specialist.   This condition may be treated with:  Medicine. Antidepressant medicine is usually prescribed for long-term daily control. Anti-anxiety medicines may be added in severe cases, especially when panic attacks occur.  Talk therapy (psychotherapy). Certain types of talk therapy can be helpful in treating NIKI by providing support, education, and guidance. Options include:  Cognitive behavioral therapy (CBT). People learn coping skills and self-calming techniques to ease their physical symptoms. They learn to identify unrealistic thoughts and behaviors and to replace them with more appropriate thoughts and behaviors.  Acceptance and commitment therapy (ACT). This treatment teaches people how to be mindful as a way to cope with unwanted thoughts and feelings.  Biofeedback. This process trains you to manage your body's response (physiological response) through breathing techniques and relaxation methods. You will work with a therapist while machines are used to monitor your physical symptoms.  Stress management techniques. These include yoga, meditation, and exercise.  A mental health specialist can help determine which treatment is best for you. Some people see improvement with one type of therapy. However, other people require a combination of therapies.    Follow these instructions at home:  Lifestyle    Maintain a consistent routine and schedule.  Anticipate stressful situations. Create a plan and allow extra time to work with your plan.  Practice stress management or self-calming techniques that you have learned from your therapist or your health care provider.  Exercise regularly and spend time outdoors.  Eat a healthy diet that includes plenty of vegetables, fruits, whole grains, low-fat dairy products, and lean protein.  Do not eat a lot of foods that are high in fat, added sugar, or salt (sodium).  Drink plenty of water.  Avoid alcohol. Alcohol can increase anxiety.  Avoid caffeine and certain over-the-counter cold medicines. These may make you feel worse. Ask your pharmacist which medicines to avoid.  General instructions    Take over-the-counter and prescription medicines only as told by your health care provider.  Understand that you are likely to have setbacks. Accept this and be kind to yourself as you persist to take better care of yourself.  Anticipate stressful situations. Create a plan and allow extra time to work with your plan.  Recognize and accept your accomplishments, even if you  them as small.  Spend time with people who care about you.  Keep all follow-up visits. This is important.  Where to find more information  National [place] of Mental Health: www.nimh.nih.gov    Substance Abuse and Mental Health Services: www.[facility].gov    Contact a health care provider if:  Your symptoms do not get better.  Your symptoms get worse.  You have signs of depression, such as:  A persistently sad or irritable mood.  Loss of enjoyment in activities that used to bring you kristian.  Change in weight or eating.  Changes in sleeping habits.  Get help right away if:  You have thoughts about hurting yourself or others.  If you ever feel like you may hurt yourself or others, or have thoughts about taking your own life, get help right away. Go to your nearest emergency department or:  Call your local emergency services (012 in the [place]  Call a suicide crisis helpline, such as the National Suicide Prevention Lifeline at 1-953.503.6210 or 380 in the [place] This is open 24 hours a day in the [place]  Text the Crisis Text Line at 160677 (in the [place]  Summary  Generalized anxiety disorder (NIKI) is a mental health condition that involves worry that is not triggered by a specific event.  People with NIKI often worry excessively about many things in their lives, such as their health and family.  NIKI may cause symptoms such as restlessness, trouble concentrating, sleep problems, frequent sweating, nausea, diarrhea, headaches, and trembling or muscle twitching.  A mental health specialist can help determine which treatment is best for you. Some people see improvement with one type of therapy. However, other people require a combination of therapies.  This information is not intended to replace advice given to you by your health care provider. Make sure you discuss any questions you have with your health care provider.  Substance Use Disorder and Mental Illness  Substance use disorder is a condition in which a person is dependent on a substance, such as drugs or alcohol. A mental illness is a condition that occurs when someone experiences changes in mood, behavior, or thinking. Sometimes, these two conditions can occur at the same time (co-occurring disorders) and may be diagnosed together (dual diagnosis).    What is the relationship between substance use disorder and mental illness?  Substance use disorder and mental illness can share symptoms and can have similar causes, such as exposure to stress or changes in brain chemicals. The risk for developing both of these conditions can be passed from parent to child (inherited). People with mental illnesses sometimes use drugs to try to relieve symptoms, and this can lead to substance use disorder. Substance use disorders occur more often in people who have depression, schizophrenia, anxiety, or personality disorders.    Also, when some drugs are used regularly, they can cause people to have symptoms of mental illness.    What are the signs or symptoms?  Symptoms vary widely for substance use disorder and mental illness, especially because these conditions can be present at the same time.    Signs of a substance use disorder    Failure to meet responsibilities at home, work, or school.  Using substances in risky situations, such as while driving or using machinery.  Taking serious risks to get drugs or alcohol.  Spending less time on activities or hobbies that used to be important.  Changes in personality or attitude for no reason, such as angry outbursts, symptoms of anxiety, or unusual giddiness.  Trying to hide the amount of drugs or alcohol used.  Increased substance use over time, or needing to use more of a substance to feel the same effects (developing a tolerance).  Physical symptoms may include:  Sudden weight loss or gain.  Sleeping too much or too little.  Uncontrolled trembling or shaking (tremors), slurred speech, or lack of coordination.  Continuing to use alcohol or a drug even though using it has led to bad outcomes or consequences, such as losing a job or ending a relationship.  Signs and symptoms of mental illness    Withdrawing from friends and family, or sudden changes in social behaviors or hobbies.  Aggression toward people and animals.  Repeatedly breaking serious rules or breaking the law.  Persistent feelings of sadness, hopelessness, or thoughts of suicide.  Having persistent thoughts or urges that are unpleasant or feel out of control (involuntary). The person may feel the need to act on the urges in order to reduce anxiety.  False beliefs (delusions).  Seeing, hearing, tasting, smelling, or feeling things that are not real (hallucinations).  Other signs include:  Sleeping too much or too little.  Weight loss or weight gain.  Being easily distracted.  Extreme mood changes (mood swings).  Confused thinking or trouble concentrating.  How is this diagnosed?  Substance use disorder and mental illnesses can be difficult to diagnose at the same time because of how varied and complex the symptoms are. Because these conditions can interact, one condition may be missed. This is why it is important to be completely honest with your health care provider about substance use and your other symptoms.    Diagnosing your condition may include:  A physical exam.  A review of your medical history and your symptoms.  Your health care provider may refer you to a mental health professional for a psychiatric evaluation. This may include assessments of:  Your use of substances.  Your risk of suicide.  Your risk of aggressive behaviors.  Your lifestyle, environment, and social situations.  Your medical health.  Your mental health and behavioral history.  How is this treated?  Three people participating in a support group.  It is best to treat substance use disorder and mental illness at the same time (integrated treatment approach). Treatment usually involves more than one of the following methods:  Detox. This refers to stopping substance abuse while being monitored by trained medical staff. This is usually the first step in treatment. Detox can last for up to 7 days.  Rehabilitation. This involves staying in a treatment center where you can have medical and mental health support all the time.  Medicines to relieve symptoms of mental illness and to control symptoms that are caused by stopping substance abuse (withdrawal symptoms).  Support groups. These groups encourage you to talk about your fears, frustrations, and anxieties with others who have the same condition.  Talk therapy. This is one-on-one therapy that can help you learn about your illness and learn ways to cope with symptoms or side effects.  Follow these instructions at home:  Medicines    Take over-the-counter and prescription medicines only as told by your health care provider.  Do not stop taking medicines unless you ask your health care provider if it is safe to do that.  Tell your health care provider about any medicine side effects that you experience.  Lifestyle    Exercise regularly. Aim for 150 minutes of moderate exercise (such as walking or biking) or 75 minutes of vigorous exercise (such as running) each week.  Eat a healthy diet with plenty of fruits and vegetables, whole grains, and lean proteins.  Avoid caffeine and tobacco. These can worsen symptoms and anxiety.  Do not drink alcohol or use drugs.  Try to get 7–9 hours of sleep each night. To do this:  Keep your bedroom cool and dark.  Do not eat a heavy meal during the hour before you go to bed.  Do not have caffeine before bedtime.  Avoid screen time during the few hours before bedtime. This means not watching TV and not using a computer, mobile phone, or tablet.  General instructions    Follow your treatment plan as directed. Work with your health care provider to adjust your treatment plan as needed.  Attend support group or therapy sessions as directed.  Explain your diagnosis to your friends and family. Let them know what your symptoms are and what things cause your symptoms to start (triggers).  Avoid triggers or stressors that may worsen your symptoms or cause you to use alcohol or drugs. Spend time with family and friends who do not use substances.  Make time to relax and do self-soothing activities, such as meditating or listening to music.  Keep all follow-up visits. This is important.  Where to find support  You may find support for coping with substance use disorder and mental illness from:  Your health care providers or your therapist. These providers can treat you or can help you find services to treat your condition.  Local support groups for people with your condition. Your health care provider or therapist may be able to recommend a support group. This may be a hospital support group, a National [place] on Mental Illness (ELIJAH) support group, or a 12-step group such as Alcoholics Anonymous (AA) or Narcotics Anonymous (NA).  Family and friends. Let them know what they can do to best support you through your recovery process.  Where to find more information  Substance Abuse and Mental Health Services Administration (SAMHSA):  Online: www.samhsa.gov  National Helpline, to talk with a person who can help you find information about treatment services in your area: 1-909.525.6536 (2-817-UEMIKR6)  U[place] Department of Health and Human Services mental health services: www.mentalhealth.gov  National [place] on Mental Illness (ELIJAH): www.elijah.org  Contact a health care provider if:  Your symptoms get worse or they do not get better.  You have negative side effects from taking medicines.  You want to discuss stopping medicines or treatment.  You start using a substance again (have a relapse).  Get help right away if:  You have thoughts about harming yourself or others.  If you ever feel like you may hurt yourself or others, or have thoughts about taking your own life, get help right away. Go to your nearest emergency department or:  Call your local emergency services (371 in the [place]  Call a suicide crisis helpline, such as the National Suicide Prevention Lifeline at 1-410.874.7644 or 226 in the [place] This is open 24 hours a day in the [place]  Text the Crisis Text Line at 534313 (in the [place]  Summary  Substance use disorder and mental illness can occur at the same time (co-occurring disorders), and they may be diagnosed together (dual diagnosis).  These conditions can be difficult to diagnose at the same time. It is important to be completely honest with your health care provider about your substance use and your other symptoms.  It is best to treat substance use disorder and mental illness at the same time (integrated treatment approach).  Identifying new ways to deal with triggers and difficult situations is an important part of recovery.  Keep all follow-up visits. Attend support groups or treatment programs as directed.  This information is not intended to replace advice given to you by your health care provider. Make sure you discuss any questions you have with your health care provider.

## 2023-11-13 NOTE — ED PROVIDER NOTE - PSYCHIATRIC, MLM
Alert and oriented to person, place, time/situation. cooperative mood and affect. no apparent risk to self or others. when stressed she will close her eyes and withdraw but immediately become re-engaged and continue with history and physicial

## 2023-11-13 NOTE — ED ADULT NURSE NOTE - BREATHING, MLM
Detail Level: Detailed Spontaneous, unlabored and symmetrical Bactrim Pregnancy And Lactation Text: This medication is Pregnancy Category D and is known to cause fetal risk.  It is also excreted in breast milk. Erythromycin Counseling:  I discussed with the patient the risks of erythromycin including but not limited to GI upset, allergic reaction, drug rash, diarrhea, increase in liver enzymes, and yeast infections. Minocycline Pregnancy And Lactation Text: This medication is Pregnancy Category D and not consider safe during pregnancy. It is also excreted in breast milk. Topical Retinoid Pregnancy And Lactation Text: This medication is Pregnancy Category C. It is unknown if this medication is excreted in breast milk. Winlevi Counseling:  I discussed with the patient the risks of topical clascoterone including but not limited to erythema, scaling, itching, and stinging. Patient voiced their understanding. Azithromycin Pregnancy And Lactation Text: This medication is considered safe during pregnancy and is also secreted in breast milk. Include Pregnancy/Lactation Warning?: No Aklief counseling:  Patient advised to apply a pea-sized amount only at bedtime and wait 30 minutes after washing their face before applying.  If too drying, patient may add a non-comedogenic moisturizer.  The most commonly reported side effects including irritation, redness, scaling, dryness, stinging, burning, itching, and increased risk of sunburn.  The patient verbalized understanding of the proper use and possible adverse effects of retinoids.  All of the patient's questions and concerns were addressed. Tetracycline Counseling: Patient counseled regarding possible photosensitivity and increased risk for sunburn.  Patient instructed to avoid sunlight, if possible.  When exposed to sunlight, patients should wear protective clothing, sunglasses, and sunscreen.  The patient was instructed to call the office immediately if the following severe adverse effects occur:  hearing changes, easy bruising/bleeding, severe headache, or vision changes.  The patient verbalized understanding of the proper use and possible adverse effects of tetracycline.  All of the patient's questions and concerns were addressed. Patient understands to avoid pregnancy while on therapy due to potential birth defects. Topical Sulfur Applications Counseling: Topical Sulfur Counseling: Patient counseled that this medication may cause skin irritation or allergic reactions.  In the event of skin irritation, the patient was advised to reduce the amount of the drug applied or use it less frequently.   The patient verbalized understanding of the proper use and possible adverse effects of topical sulfur application.  All of the patient's questions and concerns were addressed. High Dose Vitamin A Pregnancy And Lactation Text: High dose vitamin A therapy is contraindicated during pregnancy and breast feeding. Benzoyl Peroxide Pregnancy And Lactation Text: This medication is Pregnancy Category C. It is unknown if benzoyl peroxide is excreted in breast milk. Doxycycline Counseling:  Patient counseled regarding possible photosensitivity and increased risk for sunburn.  Patient instructed to avoid sunlight, if possible.  When exposed to sunlight, patients should wear protective clothing, sunglasses, and sunscreen.  The patient was instructed to call the office immediately if the following severe adverse effects occur:  hearing changes, easy bruising/bleeding, severe headache, or vision changes.  The patient verbalized understanding of the proper use and possible adverse effects of doxycycline.  All of the patient's questions and concerns were addressed. Topical Clindamycin Counseling: Patient counseled that this medication may cause skin irritation or allergic reactions.  In the event of skin irritation, the patient was advised to reduce the amount of the drug applied or use it less frequently.   The patient verbalized understanding of the proper use and possible adverse effects of clindamycin.  All of the patient's questions and concerns were addressed. Spironolactone Counseling: Patient advised regarding risks of diarrhea, abdominal pain, hyperkalemia, birth defects (for female patients), liver toxicity and renal toxicity. The patient may need blood work to monitor liver and kidney function and potassium levels while on therapy. The patient verbalized understanding of the proper use and possible adverse effects of spironolactone.  All of the patient's questions and concerns were addressed. Azelaic Acid Pregnancy And Lactation Text: This medication is considered safe during pregnancy and breast feeding. Dapsone Counseling: I discussed with the patient the risks of dapsone including but not limited to hemolytic anemia, agranulocytosis, rashes, methemoglobinemia, kidney failure, peripheral neuropathy, headaches, GI upset, and liver toxicity.  Patients who start dapsone require monitoring including baseline LFTs and weekly CBCs for the first month, then every month thereafter.  The patient verbalized understanding of the proper use and possible adverse effects of dapsone.  All of the patient's questions and concerns were addressed. Isotretinoin Pregnancy And Lactation Text: This medication is Pregnancy Category X and is considered extremely dangerous during pregnancy. It is unknown if it is excreted in breast milk. Birth Control Pills Counseling: Birth Control Pill Counseling: I discussed with the patient the potential side effects of OCPs including but not limited to increased risk of stroke, heart attack, thrombophlebitis, deep venous thrombosis, hepatic adenomas, breast changes, GI upset, headaches, and depression.  The patient verbalized understanding of the proper use and possible adverse effects of OCPs. All of the patient's questions and concerns were addressed. Bactrim Counseling:  I discussed with the patient the risks of sulfa antibiotics including but not limited to GI upset, allergic reaction, drug rash, diarrhea, dizziness, photosensitivity, and yeast infections.  Rarely, more serious reactions can occur including but not limited to aplastic anemia, agranulocytosis, methemoglobinemia, blood dyscrasias, liver or kidney failure, lung infiltrates or desquamative/blistering drug rashes. Sarecycline Counseling: Patient advised regarding possible photosensitivity and discoloration of the teeth, skin, lips, tongue and gums.  Patient instructed to avoid sunlight, if possible.  When exposed to sunlight, patients should wear protective clothing, sunglasses, and sunscreen.  The patient was instructed to call the office immediately if the following severe adverse effects occur:  hearing changes, easy bruising/bleeding, severe headache, or vision changes.  The patient verbalized understanding of the proper use and possible adverse effects of sarecycline.  All of the patient's questions and concerns were addressed. Aklief Pregnancy And Lactation Text: It is unknown if this medication is safe to use during pregnancy.  It is unknown if this medication is excreted in breast milk.  Breastfeeding women should use the topical cream on the smallest area of the skin for the shortest time needed while breastfeeding.  Do not apply to nipple and areola. Tazorac Counseling:  Patient advised that medication is irritating and drying.  Patient may need to apply sparingly and wash off after an hour before eventually leaving it on overnight.  The patient verbalized understanding of the proper use and possible adverse effects of tazorac.  All of the patient's questions and concerns were addressed. Winlevi Pregnancy And Lactation Text: This medication is considered safe during pregnancy and breastfeeding. Erythromycin Pregnancy And Lactation Text: This medication is Pregnancy Category B and is considered safe during pregnancy. It is also excreted in breast milk. Minocycline Counseling: Patient advised regarding possible photosensitivity and discoloration of the teeth, skin, lips, tongue and gums.  Patient instructed to avoid sunlight, if possible.  When exposed to sunlight, patients should wear protective clothing, sunglasses, and sunscreen.  The patient was instructed to call the office immediately if the following severe adverse effects occur:  hearing changes, easy bruising/bleeding, severe headache, or vision changes.  The patient verbalized understanding of the proper use and possible adverse effects of minocycline.  All of the patient's questions and concerns were addressed. Doxycycline Pregnancy And Lactation Text: This medication is Pregnancy Category D and not consider safe during pregnancy. It is also excreted in breast milk but is considered safe for shorter treatment courses. Topical Sulfur Applications Pregnancy And Lactation Text: This medication is Pregnancy Category C and has an unknown safety profile during pregnancy. It is unknown if this topical medication is excreted in breast milk. Topical Retinoid counseling:  Patient advised to apply a pea-sized amount only at bedtime and wait 30 minutes after washing their face before applying.  If too drying, patient may add a non-comedogenic moisturizer. The patient verbalized understanding of the proper use and possible adverse effects of retinoids.  All of the patient's questions and concerns were addressed. Azithromycin Counseling:  I discussed with the patient the risks of azithromycin including but not limited to GI upset, allergic reaction, drug rash, diarrhea, and yeast infections. Topical Clindamycin Pregnancy And Lactation Text: This medication is Pregnancy Category B and is considered safe during pregnancy. It is unknown if it is excreted in breast milk. High Dose Vitamin A Counseling: Side effects reviewed, pt to contact office should one occur. Dapsone Pregnancy And Lactation Text: This medication is Pregnancy Category C and is not considered safe during pregnancy or breast feeding. Spironolactone Pregnancy And Lactation Text: This medication can cause feminization of the male fetus and should be avoided during pregnancy. The active metabolite is also found in breast milk. Benzoyl Peroxide Counseling: Patient counseled that medicine may cause skin irritation and bleach clothing.  In the event of skin irritation, the patient was advised to reduce the amount of the drug applied or use it less frequently.   The patient verbalized understanding of the proper use and possible adverse effects of benzoyl peroxide.  All of the patient's questions and concerns were addressed. Birth Control Pills Pregnancy And Lactation Text: This medication should be avoided if pregnant and for the first 30 days post-partum. Isotretinoin Counseling: Patient should get monthly blood tests, not donate blood, not drive at night if vision affected, not share medication, and not undergo elective surgery for 6 months after tx completed. Side effects reviewed, pt to contact office should one occur. Azelaic Acid Counseling: Patient counseled that medicine may cause skin irritation and to avoid applying near the eyes.  In the event of skin irritation, the patient was advised to reduce the amount of the drug applied or use it less frequently.   The patient verbalized understanding of the proper use and possible adverse effects of azelaic acid.  All of the patient's questions and concerns were addressed. Tazorac Pregnancy And Lactation Text: This medication is not safe during pregnancy. It is unknown if this medication is excreted in breast milk. Detail Level: Zone

## 2023-11-13 NOTE — ED ADULT NURSE REASSESSMENT NOTE - NS ED NURSE REASSESS COMMENT FT1
Spoke to patient who was yelling at this time; Dr. Lombardo spoke to patient regarding her buspar; MD wanted to get a urine sample from her.

## 2023-11-13 NOTE — ED ADULT NURSE NOTE - CHIEF COMPLAINT QUOTE
c/o seizure last friday and family drove her to [facility] but left without being seen and thinks she had another seizure today, ambulatory to Triage in no distress

## 2023-11-13 NOTE — ED PROVIDER NOTE - NSFOLLOWUPCLINICS_GEN_ALL_ED_FT
[facility]  Family Medicine  [address] [place] [address]  Phone: [phone]  Fax:     [facility]  Psychiatry  [address] [place] [address]  Phone: [phone]  Fax:     [facility]  Psychiatry  [address] [place] [address]  Phone: [phone]  Fax:     [facility]  Psychiatry  [address] [place] [address]  Phone: [phone]  Fax:     [facility]  [place] [address]  Phone: [phone]  Fax:     [facility]  Psychiatry  [address] [place] [address]  Phone: [phone]  Fax:

## 2023-11-13 NOTE — ED PROVIDER NOTE - CONSTITUTIONAL, MLM
normal... Well appearing, awake, alert, oriented to person, place, time/situation and in no apparent distress. cooperative provides history not toxic appearing

## 2023-11-13 NOTE — ED PROVIDER NOTE - OBJECTIVE STATEMENT
with pt permission-called her mother by phone Bisi Hillman [phone]  pt is recovering addict- sober for 6 mos on suboxone.  last couple of days acting funny, hallucinating, and was acting strange. a home drug test was negative.  pt is on a lot of psych meds- and went to [facility] but eloped before ed evaluation on Friday because while at a friends house she called her step dad (who is here with her) and was reported to have had a seizure.  according to mom pt has been acting off since and may have had a seizure today.  when speaking with the patient: She advised she initially was alcoholic and sober for 7 months.  But this was confirmed to be more polysubstance abuse, opiates and benzos.  She status post I&D with skin grafting for MRSA abscess of her left arm, she has multiple sclerosed areas of prior abscesses on her arms and hands.  The mother father and patient all agreed patient's been sober for at least 6 to 7 months.  She did start a medication called BuSpar, but soon after the prescription was filled she "lost it" and then the abnormal behavior was noted last week.  Out of an abundance of concern the contacted the patient's psychotherapist who recommended she go to [facility].  Mother is an RN at [employer] who recommended she come to the [facility] for immediate evaluation and care to rule out any toxic metabolic derangement.  After which point the mother states she will take the patient to [facility] for psychiatric evaluation.  Patient has no primary care physician.

## 2023-11-13 NOTE — ED ADULT NURSE NOTE - NSICDXPASTMEDICALHX_GEN_ALL_CORE_FT
PAST MEDICAL HISTORY:  Depression     No pertinent past medical history     Polysubstance abuse     Seizures

## 2023-11-13 NOTE — ED PROVIDER NOTE - NSICDXPASTSURGICALHX_GEN_ALL_CORE_FT
PAST SURGICAL HISTORY:  No significant past surgical history     No significant past surgical history      Goals of care, counseling/discussion

## 2023-11-13 NOTE — ED PROVIDER NOTE - ENMT, MLM
Airway patent, Nasal mucosa clear. Mouth with normal mucosa. Throat has no vesicles, no oropharyngeal exudates and uvula is midline. has cracked lower lip no lingual hematoma or lacerations no intraoral lesions or lacerations indicative of tongue or buccal biting during a seizure.  pt has a scab on her mid forehead that is from picking a zit.

## 2023-11-14 PROBLEM — Z78.9 OTHER SPECIFIED HEALTH STATUS: Chronic | Status: ACTIVE | Noted: 2020-08-05
